# Patient Record
Sex: MALE | Race: BLACK OR AFRICAN AMERICAN | NOT HISPANIC OR LATINO | URBAN - METROPOLITAN AREA
[De-identification: names, ages, dates, MRNs, and addresses within clinical notes are randomized per-mention and may not be internally consistent; named-entity substitution may affect disease eponyms.]

---

## 2021-09-07 ENCOUNTER — INPATIENT (INPATIENT)
Facility: HOSPITAL | Age: 71
LOS: 2 days | Discharge: HOME | DRG: 247 | End: 2021-09-10
Attending: INTERNAL MEDICINE | Admitting: INTERNAL MEDICINE
Payer: COMMERCIAL

## 2021-09-07 VITALS
SYSTOLIC BLOOD PRESSURE: 158 MMHG | DIASTOLIC BLOOD PRESSURE: 82 MMHG | HEART RATE: 62 BPM | TEMPERATURE: 98 F | RESPIRATION RATE: 20 BRPM | WEIGHT: 154.98 LBS | OXYGEN SATURATION: 96 % | HEIGHT: 65 IN

## 2021-09-07 DIAGNOSIS — R94.39 ABNORMAL RESULT OF OTHER CARDIOVASCULAR FUNCTION STUDY: ICD-10-CM

## 2021-09-07 DIAGNOSIS — E11.9 TYPE 2 DIABETES MELLITUS WITHOUT COMPLICATIONS: ICD-10-CM

## 2021-09-07 DIAGNOSIS — I10 ESSENTIAL (PRIMARY) HYPERTENSION: ICD-10-CM

## 2021-09-07 LAB
A1C WITH ESTIMATED AVERAGE GLUCOSE RESULT: 5.6 % — SIGNIFICANT CHANGE UP (ref 4–5.6)
ALBUMIN SERPL ELPH-MCNC: 3.7 G/DL — SIGNIFICANT CHANGE UP (ref 3.3–5)
ALP SERPL-CCNC: 70 U/L — SIGNIFICANT CHANGE UP (ref 40–120)
ALT FLD-CCNC: 26 U/L — SIGNIFICANT CHANGE UP (ref 10–45)
ANION GAP SERPL CALC-SCNC: 10 MMOL/L — SIGNIFICANT CHANGE UP (ref 5–17)
APTT BLD: 31.1 SEC — SIGNIFICANT CHANGE UP (ref 27.5–35.5)
AST SERPL-CCNC: 23 U/L — SIGNIFICANT CHANGE UP (ref 10–40)
BASOPHILS # BLD AUTO: 0.08 K/UL — SIGNIFICANT CHANGE UP (ref 0–0.2)
BASOPHILS NFR BLD AUTO: 1.4 % — SIGNIFICANT CHANGE UP (ref 0–2)
BILIRUB SERPL-MCNC: 0.3 MG/DL — SIGNIFICANT CHANGE UP (ref 0.2–1.2)
BUN SERPL-MCNC: 8 MG/DL — SIGNIFICANT CHANGE UP (ref 7–23)
CALCIUM SERPL-MCNC: 9.5 MG/DL — SIGNIFICANT CHANGE UP (ref 8.4–10.5)
CHLORIDE SERPL-SCNC: 108 MMOL/L — SIGNIFICANT CHANGE UP (ref 96–108)
CHOLEST SERPL-MCNC: 193 MG/DL — SIGNIFICANT CHANGE UP
CO2 SERPL-SCNC: 24 MMOL/L — SIGNIFICANT CHANGE UP (ref 22–31)
CREAT SERPL-MCNC: 0.87 MG/DL — SIGNIFICANT CHANGE UP (ref 0.5–1.3)
EOSINOPHIL # BLD AUTO: 0.48 K/UL — SIGNIFICANT CHANGE UP (ref 0–0.5)
EOSINOPHIL NFR BLD AUTO: 8.4 % — HIGH (ref 0–6)
ESTIMATED AVERAGE GLUCOSE: 114 MG/DL — SIGNIFICANT CHANGE UP (ref 68–114)
GLUCOSE BLDC GLUCOMTR-MCNC: 106 MG/DL — HIGH (ref 70–99)
GLUCOSE BLDC GLUCOMTR-MCNC: 87 MG/DL — SIGNIFICANT CHANGE UP (ref 70–99)
GLUCOSE SERPL-MCNC: 104 MG/DL — HIGH (ref 70–99)
HCT VFR BLD CALC: 42.2 % — SIGNIFICANT CHANGE UP (ref 39–50)
HDLC SERPL-MCNC: 52 MG/DL — SIGNIFICANT CHANGE UP
HGB BLD-MCNC: 14.1 G/DL — SIGNIFICANT CHANGE UP (ref 13–17)
IMM GRANULOCYTES NFR BLD AUTO: 0.2 % — SIGNIFICANT CHANGE UP (ref 0–1.5)
INR BLD: 0.98 RATIO — SIGNIFICANT CHANGE UP (ref 0.88–1.16)
LIPID PNL WITH DIRECT LDL SERPL: 131 MG/DL — HIGH
LYMPHOCYTES # BLD AUTO: 2.74 K/UL — SIGNIFICANT CHANGE UP (ref 1–3.3)
LYMPHOCYTES # BLD AUTO: 47.8 % — HIGH (ref 13–44)
MAGNESIUM SERPL-MCNC: 2.1 MG/DL — SIGNIFICANT CHANGE UP (ref 1.6–2.6)
MCHC RBC-ENTMCNC: 30.3 PG — SIGNIFICANT CHANGE UP (ref 27–34)
MCHC RBC-ENTMCNC: 33.4 GM/DL — SIGNIFICANT CHANGE UP (ref 32–36)
MCV RBC AUTO: 90.6 FL — SIGNIFICANT CHANGE UP (ref 80–100)
MONOCYTES # BLD AUTO: 0.55 K/UL — SIGNIFICANT CHANGE UP (ref 0–0.9)
MONOCYTES NFR BLD AUTO: 9.6 % — SIGNIFICANT CHANGE UP (ref 2–14)
NEUTROPHILS # BLD AUTO: 1.87 K/UL — SIGNIFICANT CHANGE UP (ref 1.8–7.4)
NEUTROPHILS NFR BLD AUTO: 32.6 % — LOW (ref 43–77)
NON HDL CHOLESTEROL: 141 MG/DL — HIGH
NRBC # BLD: 0 /100 WBCS — SIGNIFICANT CHANGE UP (ref 0–0)
NT-PROBNP SERPL-SCNC: 114 PG/ML — SIGNIFICANT CHANGE UP (ref 0–300)
PLATELET # BLD AUTO: 188 K/UL — SIGNIFICANT CHANGE UP (ref 150–400)
POTASSIUM SERPL-MCNC: 4.1 MMOL/L — SIGNIFICANT CHANGE UP (ref 3.5–5.3)
POTASSIUM SERPL-SCNC: 4.1 MMOL/L — SIGNIFICANT CHANGE UP (ref 3.5–5.3)
PROT SERPL-MCNC: 6.4 G/DL — SIGNIFICANT CHANGE UP (ref 6–8.3)
PROTHROM AB SERPL-ACNC: 11.8 SEC — SIGNIFICANT CHANGE UP (ref 10.6–13.6)
RBC # BLD: 4.66 M/UL — SIGNIFICANT CHANGE UP (ref 4.2–5.8)
RBC # FLD: 13 % — SIGNIFICANT CHANGE UP (ref 10.3–14.5)
SARS-COV-2 RNA SPEC QL NAA+PROBE: SIGNIFICANT CHANGE UP
SODIUM SERPL-SCNC: 142 MMOL/L — SIGNIFICANT CHANGE UP (ref 135–145)
TRIGL SERPL-MCNC: 51 MG/DL — SIGNIFICANT CHANGE UP
TROPONIN T, HIGH SENSITIVITY RESULT: 8 NG/L — SIGNIFICANT CHANGE UP (ref 0–51)
TROPONIN T, HIGH SENSITIVITY RESULT: 8 NG/L — SIGNIFICANT CHANGE UP (ref 0–51)
WBC # BLD: 5.73 K/UL — SIGNIFICANT CHANGE UP (ref 3.8–10.5)
WBC # FLD AUTO: 5.73 K/UL — SIGNIFICANT CHANGE UP (ref 3.8–10.5)

## 2021-09-07 PROCEDURE — 93010 ELECTROCARDIOGRAM REPORT: CPT

## 2021-09-07 PROCEDURE — 71046 X-RAY EXAM CHEST 2 VIEWS: CPT | Mod: 26

## 2021-09-07 PROCEDURE — 99223 1ST HOSP IP/OBS HIGH 75: CPT | Mod: GC

## 2021-09-07 PROCEDURE — 99285 EMERGENCY DEPT VISIT HI MDM: CPT | Mod: 25

## 2021-09-07 RX ORDER — DEXTROSE 50 % IN WATER 50 %
12.5 SYRINGE (ML) INTRAVENOUS ONCE
Refills: 0 | Status: DISCONTINUED | OUTPATIENT
Start: 2021-09-07 | End: 2021-09-10

## 2021-09-07 RX ORDER — GLUCAGON INJECTION, SOLUTION 0.5 MG/.1ML
1 INJECTION, SOLUTION SUBCUTANEOUS ONCE
Refills: 0 | Status: DISCONTINUED | OUTPATIENT
Start: 2021-09-07 | End: 2021-09-10

## 2021-09-07 RX ORDER — DEXTROSE 50 % IN WATER 50 %
25 SYRINGE (ML) INTRAVENOUS ONCE
Refills: 0 | Status: DISCONTINUED | OUTPATIENT
Start: 2021-09-07 | End: 2021-09-10

## 2021-09-07 RX ORDER — ASPIRIN/CALCIUM CARB/MAGNESIUM 324 MG
81 TABLET ORAL DAILY
Refills: 0 | Status: DISCONTINUED | OUTPATIENT
Start: 2021-09-07 | End: 2021-09-10

## 2021-09-07 RX ORDER — SODIUM CHLORIDE 9 MG/ML
1000 INJECTION, SOLUTION INTRAVENOUS
Refills: 0 | Status: DISCONTINUED | OUTPATIENT
Start: 2021-09-07 | End: 2021-09-10

## 2021-09-07 RX ORDER — ATORVASTATIN CALCIUM 80 MG/1
40 TABLET, FILM COATED ORAL AT BEDTIME
Refills: 0 | Status: DISCONTINUED | OUTPATIENT
Start: 2021-09-07 | End: 2021-09-10

## 2021-09-07 RX ORDER — HEPARIN SODIUM 5000 [USP'U]/ML
5000 INJECTION INTRAVENOUS; SUBCUTANEOUS EVERY 12 HOURS
Refills: 0 | Status: DISCONTINUED | OUTPATIENT
Start: 2021-09-07 | End: 2021-09-10

## 2021-09-07 RX ORDER — DEXTROSE 50 % IN WATER 50 %
15 SYRINGE (ML) INTRAVENOUS ONCE
Refills: 0 | Status: DISCONTINUED | OUTPATIENT
Start: 2021-09-07 | End: 2021-09-10

## 2021-09-07 RX ORDER — INSULIN LISPRO 100/ML
VIAL (ML) SUBCUTANEOUS
Refills: 0 | Status: DISCONTINUED | OUTPATIENT
Start: 2021-09-07 | End: 2021-09-10

## 2021-09-07 RX ADMIN — ATORVASTATIN CALCIUM 40 MILLIGRAM(S): 80 TABLET, FILM COATED ORAL at 21:41

## 2021-09-07 RX ADMIN — HEPARIN SODIUM 5000 UNIT(S): 5000 INJECTION INTRAVENOUS; SUBCUTANEOUS at 17:07

## 2021-09-07 NOTE — ED ADULT NURSE NOTE - OBJECTIVE STATEMENT
71 year old male with PMH of arthritis presents to the ED via EMS from cardiologist office for abnormal stress test. As per EMS, patient was given 324 of ASA prior to arrival to ED. Patient is A&Ox3, states he has been having intermittent chest pain x 2 months. Patient states he has no cardiac history - does not take any medication on a daily basis except for sips of beer "as a home remedy." Patient denies currently having chest pain. VSS. EKG done and given to ED MD Hicks. Placed patient on CM - NSR. 18g peripheral IV placed by EMS in L wrist patent to flush and draws back blood. labs drawn and sent to lab. Patient undressed and placed into gown, call bell in hand and side rails up with bed in lowest position for safety. blanket provided. Comfort and safety provided.

## 2021-09-07 NOTE — ED PROVIDER NOTE - PROGRESS NOTE DETAILS
Moe, PGY3 - spoke w/ Dr. Escobar discussed case, s/p aspirin, no cp now, admit for cath in setting of unstable angina. agrees w/ plan

## 2021-09-07 NOTE — ED PROVIDER NOTE - ATTENDING CONTRIBUTION TO CARE
------------ATTENDING NOTE------------   pt transferred by EMS from cardiology stress center for abnormal stress test today, pt asymptomatic, no chest pain/discomfort or sob/dyspnea, very well appearing, clear chest w/o distress, nml cardiac exam, equal distal pulses, soft benign abd, no edema/calf tenderness, awaiting labs/imaging and d/w pt's Cardiologist, given  mg by EMS -->  - Wilfrido Hicks MD   ------------------------------------------------

## 2021-09-07 NOTE — ED PROVIDER NOTE - PHYSICAL EXAMINATION
Gen: well developed male NAD   HEENT: NCAT, EOMI, no nasal discharge, mucous membranes moist  CV: RRR, +S1/S2, no M/R/G distal pulse intact and equal b/l   Resp: CTAB, no W/R/R  GI: Abdomen soft non-distended, NTTP, no masses  MSK: No open wounds no LE edema  Neuro: A&Ox4, following commands, moving all four extremities spontaneously  Psych: appropriate mood

## 2021-09-07 NOTE — ED PROVIDER NOTE - OBJECTIVE STATEMENT
70 y/o M PMH HTN DM presents to ED sent by cardiologist for EKG changes during nuclear stress test today - had TWI in anterior leads. pt notes intermittent L sided chest pain, comes and goes, not worse w/ exertion, sometimes w/ SOB, sometimes radiating to neck / back. Denies cp this morning during test or now. Pt came by EMS, s/p 324mg aspirin PTA.    PMD: Dr. Chandra   Cardiology: Dr. John Escobar 72 y/o M PMH HTN DM presents to ED sent by cardiologist for EKG changes during nuclear stress test today - had TWI in anterior leads. pt notes intermittent L sided chest pain, comes and goes, not worse w/ exertion, sometimes w/ SOB, sometimes radiating to neck / back. Denies cp this morning during test or now. Pt came by EMS, s/p 324mg aspirin PTA.    IM: Dr. John Escobar   Cardiology: Dr. Manish Russ

## 2021-09-07 NOTE — H&P ADULT - HISTORY OF PRESENT ILLNESS
70 y/o M PMH HTN DM presents to ED sent by cardiologist dr Manish newman for EKG changes during nuclear stress test today , developed TWI in anterior leads (V3). pt notes intermittent L sided chest pain for the past few days, sometimes w/ SOB, sometimes radiating to neck / back. Denies cp this morning during test or now.

## 2021-09-07 NOTE — CONSULT NOTE ADULT - SUBJECTIVE AND OBJECTIVE BOX
Patient seen and evaluated @   Chief Complaint:     HPI:  72 y/o M PMH HTN DM presents to ED sent by cardiologist dr Manish newman for EKG changes during nuclear stress test today , developed TWI in anterior leads (V3). pt notes intermittent L sided chest pain for the past few days, sometimes w/ SOB, sometimes radiating to neck / back. Denies cp this morning during test or now. (07 Sep 2021 14:12)    Upon examination, patient remained symptom free. Denies any cardiopulmonary symptom at the moment.     PMH:   HTN (hypertension)    DM (diabetes mellitus)      PSH:   No significant past surgical history      Medications:   aspirin enteric coated 81 milliGRAM(s) Oral daily  atorvastatin 40 milliGRAM(s) Oral at bedtime  dextrose 40% Gel 15 Gram(s) Oral once  dextrose 5%. 1000 milliLiter(s) IV Continuous <Continuous>  dextrose 5%. 1000 milliLiter(s) IV Continuous <Continuous>  dextrose 50% Injectable 25 Gram(s) IV Push once  dextrose 50% Injectable 12.5 Gram(s) IV Push once  dextrose 50% Injectable 25 Gram(s) IV Push once  glucagon  Injectable 1 milliGRAM(s) IntraMuscular once  heparin   Injectable 5000 Unit(s) SubCutaneous every 12 hours  insulin lispro (ADMELOG) corrective regimen sliding scale   SubCutaneous three times a day before meals    Allergies:  No Known Allergies    FAMILY HISTORY:    Social History:  Smoking: former smoker (quit 1972)  Alcohol: no EtOH  Drugs: no rec drug use    Review of Systems:  Constitutional: [ ] Fever [ ] Chills [ ] Fatigue [ ] Weight change   HEENT: [ ] Blurred vision [ ] Eye Pain [ ] Headache [ ] Runny nose [ ] Sore Throat   Respiratory: [ ] Cough [ ] Wheezing [ ] Shortness of breath  Cardiovascular: [ ] Chest Pain [ ] Palpitations [ ] ADAMS [ ] PND [ ] Orthopnea  Gastrointestinal: [ ] Abdominal Pain [ ] Diarrhea [ ] Constipation [ ] Hemorrhoids [ ] Nausea [ ] Vomiting  Genitourinary: [ ] Nocturia [ ] Dysuria [ ] Incontinence  Extremities: [ ] Swelling [ ] Joint Pain  Neurologic: [ ] Focal deficit [ ] Paresthesias [ ] Syncope  Lymphatic: [ ] Swelling [ ] Lymphadenopathy   Skin: [ ] Rash [ ] Ecchymoses [ ] Wounds [ ] Lesions  Psychiatry: [ ] Depression [ ] Suicidal/Homicidal Ideation [ ] Anxiety [ ] Sleep Disturbances  [x ] 10 point review of systems is otherwise negative except as mentioned above            [ ]Unable to obtain    Physical Exam:  T(C): 36.6 (09-07-21 @ 13:05), Max: 36.7 (09-07-21 @ 11:01)  HR: 72 (09-07-21 @ 13:05) (62 - 72)  BP: 145/78 (09-07-21 @ 13:05) (145/78 - 160/81)  RR: 18 (09-07-21 @ 13:05) (18 - 20)  SpO2: 97% (09-07-21 @ 13:05) (96% - 100%)  Wt(kg): --    Daily Height in cm: 165.1 (07 Sep 2021 10:50)    Daily     Appearance: NAD  Eyes: PERRL, EOMI  HENT: Normal oral mus[place], NC/AT  Cardiovascular: normal S1 and S2, RRR, no m/r/g, no edema  Respiratory: Clear to auscultation bilaterally  Gastrointestinal: Soft, non-tender, non-distended, BS+  Musculoskeletal: No clubbing, no joint deformity   Neurologic: Non-focal  Lymphatic: No lymphadenopathy  Psychiatry: AAOx3, mood & affect appropriate  Skin: No rashes, no ecchymoses, no cyanosis    Cardiovascular Diagnostic Testing:  ECG: no ischemic changes.    Echo:    Stress Testing:    Cath:    Interpretation of Telemetry:    Imaging:    Labs:                        14.1   5.73  )-----------( 188      ( 07 Sep 2021 11:08 )             42.2     09-07    142  |  108  |  8   ----------------------------<  104<H>  4.1   |  24  |  0.87    Ca    9.5      07 Sep 2021 11:08  Mg     2.1     09-07    TPro  6.4  /  Alb  3.7  /  TBili  0.3  /  DBili  x   /  AST  23  /  ALT  26  /  AlkPhos  70  09-07    PT/INR - ( 07 Sep 2021 11:08 )   PT: 11.8 sec;   INR: 0.98 ratio         PTT - ( 07 Sep 2021 11:08 )  PTT:31.1 sec      Serum Pro-Brain Natriuretic Peptide: 114 pg/mL (09-07 @ 11:08)         HPI:  72 y/o M PMH HTN DM presents to ED sent by cardiologist, Dr Manish Chan for EKG changes during nuclear stress test today. Developed TWI in anterior leads (V3). Pt notes intermittent L sided chest pain for the past few days, sometimes w/ SOB, sometimes radiating to neck / back. Denies cp this morning during test or now. (07 Sep 2021 14:12)    Upon examination, patient remained symptom free. Denies any cardiopulmonary symptom at the moment.     PMH:   HTN (hypertension)  DM (diabetes mellitus)      PSH:   No significant past surgical history        Medications:   aspirin enteric coated 81 milliGRAM(s) Oral daily  atorvastatin 40 milliGRAM(s) Oral at bedtime  dextrose 40% Gel 15 Gram(s) Oral once  dextrose 5%. 1000 milliLiter(s) IV Continuous <Continuous>  dextrose 5%. 1000 milliLiter(s) IV Continuous <Continuous>  dextrose 50% Injectable 25 Gram(s) IV Push once  dextrose 50% Injectable 12.5 Gram(s) IV Push once  dextrose 50% Injectable 25 Gram(s) IV Push once  glucagon  Injectable 1 milliGRAM(s) IntraMuscular once  heparin   Injectable 5000 Unit(s) SubCutaneous every 12 hours  insulin lispro (ADMELOG) corrective regimen sliding scale   SubCutaneous three times a day before meals    Allergies:  No Known Allergies    FAMILY HISTORY:  No heart disease      Social History:  Smoking: former smoker (quit 1972)  Alcohol: no EtOH  Drugs: no rec drug use    Review of Systems:  Constitutional: [ ] Fever [ ] Chills [ ] Fatigue [ ] Weight change   HEENT: [ ] Blurred vision [ ] Eye Pain [ ] Headache [ ] Runny nose [ ] Sore Throat   Respiratory: [ ] Cough [ ] Wheezing [ ] Shortness of breath  Cardiovascular: [ ] Chest Pain [ ] Palpitations [ ] ADAMS [ ] PND [ ] Orthopnea  Gastrointestinal: [ ] Abdominal Pain [ ] Diarrhea [ ] Constipation [ ] Hemorrhoids [ ] Nausea [ ] Vomiting  Genitourinary: [ ] Nocturia [ ] Dysuria [ ] Incontinence  Extremities: [ ] Swelling [ ] Joint Pain  Neurologic: [ ] Focal deficit [ ] Paresthesias [ ] Syncope  Lymphatic: [ ] Swelling [ ] Lymphadenopathy   Skin: [ ] Rash [ ] Ecchymoses [ ] Wounds [ ] Lesions  Psychiatry: [ ] Depression [ ] Suicidal/Homicidal Ideation [ ] Anxiety [ ] Sleep Disturbances  [x ] 10 point review of systems is otherwise negative except as mentioned above                  Physical Exam:  T(C): 36.6 (09-07-21 @ 13:05), Max: 36.7 (09-07-21 @ 11:01)  HR: 72 (09-07-21 @ 13:05) (62 - 72)  BP: 145/78 (09-07-21 @ 13:05) (145/78 - 160/81)  RR: 18 (09-07-21 @ 13:05) (18 - 20)  SpO2: 97% (09-07-21 @ 13:05) (96% - 100%)  Daily Height in cm: 165.1 (07 Sep 2021 10:50)        Appearance: NAD  Eyes: PERRL, EOMI  HENT: Normal oral [place], NC/AT  Cardiovascular: normal S1 and S2, RRR, no m/r/g, no edema  Respiratory: Clear to auscultation bilaterally  Gastrointestinal: Soft, non-tender, non-distended, BS+  Musculoskeletal: No clubbing, no joint deformity   Neurologic: Non-focal  Lymphatic: No lymphadenopathy  Psychiatry: AAOx3, mood & affect appropriate  Skin: No rashes, no ecchymoses, no cyanosis        ECG: NSR, no ischemic changes.      Labs:                     14.1   5.73  )-----------( 188      ( 07 Sep 2021 11:08 )             42.2     09-07  142  |  108  |  8   ----------------------------<  104<H>  4.1   |  24  |  0.87    Ca    9.5      07 Sep 2021 11:08  Mg     2.1     09-07    TPro  6.4  /  Alb  3.7  /  TBili  0.3  /  DBili  x   /  AST  23  /  ALT  26  /  AlkPhos  70  09-07    PT/INR - ( 07 Sep 2021 11:08 )   PT: 11.8 sec;   INR: 0.98 ratio    PTT - ( 07 Sep 2021 11:08 )  PTT:31.1 sec    Serum Pro-Brain Natriuretic Peptide: 114 pg/mL (09-07 @ 11:08)

## 2021-09-07 NOTE — CONSULT NOTE ADULT - ASSESSMENT
72 y/o M PMH HTN DM presents to ED sent by cardiologist dr Manish newman for ACS evaluation    # Chest pain  - patient currently remains symptomatically free.  - document pt brought in reviewed (9/7/21 Note to go with EMS):   - TTE on 7/2/2021 showed normal LV dimensions with normal systolic function EF 65%. mild AR.  - Nuc stress test (pharmacologic) 7/13/21 showed no evidence of ischemia by perfusion criteria  - EKG on 7/2/21 showing TwI in lead V3-V6. But EKG in the ED without acute ischemic changes, no TwI, no ST deviation fitting STEMI criteria.  - Trop 8 -> 8.  - concern for active myocardial infarction is low.  - Would acquire TTE.   - Can acquire CCTA for coronary anatomy evaluation given that functional perfusion test with nuc stress test was already done as outpatient.  - monitor on tele.  - monitor electrolytes  - repeat trop and EKG if pt develops chest pain.     72 y/o M PMH HTN DM, presents to ED sent by cardiologist Dr Manish Chan for ACS evaluation      #1.   Chest pain, - patient currently remains free of symptoms.  Document pt. brought in reviewed (9/7/21 Note to go with EMS):   - TTE on 7/2/2021 showed normal LV dimensions with normal systolic function EF 65%. mild AR.  - Nuc stress test (pharmacologic) 7/13/21 showed no evidence of ischemia by perfusion criteria  - EKG on 7/2/21 showing TwI in lead V3-V6. But EKG in the ED without acute ischemic changes, no TwI, no ST deviation fitting STEMI criteria.  - Trop 8 -> 8.  - concern for active myocardial infarction is low.  - Would acquire TTE.   - Can acquire CCTA for coronary anatomy evaluation given that functional perfusion test with nuc stress test was already done as outpatient.  - monitor on tele.  - monitor electrolytes  - repeat trop and EKG if pt develops chest pain.      Jacquie Mayer M.D.  Cardiology fellow    Plan discussed with cardiology fellow; patient seen and examined.       I was physically present for the key portions of the evaluation and management (E/M) service provided.    I agree with the above history, physical, and plan which I have reviewed and edited where appropriate.  Keshav Garcia M.D.  Cardiology Attending, Consult Service    For Cardiology consults and questions, all Cardiology service information can be found 24/7 on [email or web address], password: [email or web address]

## 2021-09-08 LAB
A1C WITH ESTIMATED AVERAGE GLUCOSE RESULT: 5.6 % — SIGNIFICANT CHANGE UP (ref 4–5.6)
COVID-19 SPIKE DOMAIN AB INTERP: NEGATIVE — SIGNIFICANT CHANGE UP
COVID-19 SPIKE DOMAIN ANTIBODY RESULT: 0.4 U/ML — SIGNIFICANT CHANGE UP
ESTIMATED AVERAGE GLUCOSE: 114 MG/DL — SIGNIFICANT CHANGE UP (ref 68–114)
GLUCOSE BLDC GLUCOMTR-MCNC: 104 MG/DL — HIGH (ref 70–99)
GLUCOSE BLDC GLUCOMTR-MCNC: 84 MG/DL — SIGNIFICANT CHANGE UP (ref 70–99)
GLUCOSE BLDC GLUCOMTR-MCNC: 88 MG/DL — SIGNIFICANT CHANGE UP (ref 70–99)
GLUCOSE BLDC GLUCOMTR-MCNC: 99 MG/DL — SIGNIFICANT CHANGE UP (ref 70–99)
HCV AB S/CO SERPL IA: 0.15 S/CO — SIGNIFICANT CHANGE UP (ref 0–0.99)
HCV AB SERPL-IMP: SIGNIFICANT CHANGE UP
SARS-COV-2 IGG+IGM SERPL QL IA: 0.4 U/ML — SIGNIFICANT CHANGE UP
SARS-COV-2 IGG+IGM SERPL QL IA: NEGATIVE — SIGNIFICANT CHANGE UP

## 2021-09-08 PROCEDURE — 93306 TTE W/DOPPLER COMPLETE: CPT | Mod: 26

## 2021-09-08 PROCEDURE — 99233 SBSQ HOSP IP/OBS HIGH 50: CPT | Mod: GC

## 2021-09-08 PROCEDURE — 75574 CT ANGIO HRT W/3D IMAGE: CPT | Mod: 26

## 2021-09-08 RX ORDER — INFLUENZA VIRUS VACCINE 15; 15; 15; 15 UG/.5ML; UG/.5ML; UG/.5ML; UG/.5ML
0.5 SUSPENSION INTRAMUSCULAR ONCE
Refills: 0 | Status: COMPLETED | OUTPATIENT
Start: 2021-09-08 | End: 2021-09-08

## 2021-09-08 RX ADMIN — Medication 81 MILLIGRAM(S): at 11:06

## 2021-09-08 RX ADMIN — HEPARIN SODIUM 5000 UNIT(S): 5000 INJECTION INTRAVENOUS; SUBCUTANEOUS at 05:35

## 2021-09-08 RX ADMIN — HEPARIN SODIUM 5000 UNIT(S): 5000 INJECTION INTRAVENOUS; SUBCUTANEOUS at 17:11

## 2021-09-08 RX ADMIN — ATORVASTATIN CALCIUM 40 MILLIGRAM(S): 80 TABLET, FILM COATED ORAL at 21:08

## 2021-09-08 NOTE — PROGRESS NOTE ADULT - SUBJECTIVE AND OBJECTIVE BOX
Patient is a 71y old  Male who presents with a chief complaint of     HPI:  No CP at rest. Coronary CT done    PAST MEDICAL & SURGICAL HISTORY:  HTN (hypertension)    DM (diabetes mellitus)    No significant past surgical history        Review of Systems:   CONSTITUTIONAL: No fever, weight loss, or fatigue  EYES: No eye pain, visual disturbances, or discharge  ENMT:  No difficulty hearing, tinnitus, vertigo; No sinus or throat pain  NECK: No pain or stiffness  BREASTS: No pain, masses, or nipple discharge  RESPIRATORY: No cough, wheezing, chills or hemoptysis; No shortness of breath  CARDIOVASCULAR: No chest pain, palpitations, dizziness, or leg swelling  GASTROINTESTINAL: No abdominal or epigastric pain. No nausea, vomiting, or hematemesis; No diarrhea or constipation. No melena or hematochezia.  GENITOURINARY: No dysuria, frequency, hematuria, or incontinence  NEUROLOGICAL: No headaches, memory loss, loss of strength, numbness, or tremors  SKIN: No itching, burning, rashes, or lesions   LYMPH NODES: No enlarged glands  ENDOCRINE: No heat or cold intolerance; No hair loss  MUSCULOSKELETAL: No joint pain or swelling; No muscle, back, or extremity pain  PSYCHIATRIC: No depression, anxiety, mood swings, or difficulty sleeping  HEME/LYMPH: No easy bruising, or bleeding gums  ALLERY AND IMMUNOLOGIC: No hives or eczema    Allergies    No Known Allergies    Intolerances        Social History:     FAMILY HISTORY:      MEDICATIONS  (STANDING):  aspirin enteric coated 81 milliGRAM(s) Oral daily  atorvastatin 40 milliGRAM(s) Oral at bedtime  dextrose 40% Gel 15 Gram(s) Oral once  dextrose 5%. 1000 milliLiter(s) (50 mL/Hr) IV Continuous <Continuous>  dextrose 5%. 1000 milliLiter(s) (100 mL/Hr) IV Continuous <Continuous>  dextrose 50% Injectable 25 Gram(s) IV Push once  dextrose 50% Injectable 12.5 Gram(s) IV Push once  dextrose 50% Injectable 25 Gram(s) IV Push once  glucagon  Injectable 1 milliGRAM(s) IntraMuscular once  heparin   Injectable 5000 Unit(s) SubCutaneous every 12 hours  insulin lispro (ADMELOG) corrective regimen sliding scale   SubCutaneous three times a day before meals    MEDICATIONS  (PRN):        CAPILLARY BLOOD GLUCOSE      POCT Blood Glucose.: 88 mg/dL (08 Sep 2021 16:13)  POCT Blood Glucose.: 99 mg/dL (08 Sep 2021 11:27)  POCT Blood Glucose.: 84 mg/dL (08 Sep 2021 07:28)  POCT Blood Glucose.: 106 mg/dL (07 Sep 2021 21:16)    I&O's Summary    07 Sep 2021 07:01  -  08 Sep 2021 07:00  --------------------------------------------------------  IN: 0 mL / OUT: 600 mL / NET: -600 mL    08 Sep 2021 07:01  -  08 Sep 2021 18:38  --------------------------------------------------------  IN: 560 mL / OUT: 0 mL / NET: 560 mL        PHYSICAL EXAM:  Vital Signs Last 24 Hrs  T(C): 36.9 (08 Sep 2021 11:20), Max: 36.9 (08 Sep 2021 11:20)  T(F): 98.4 (08 Sep 2021 11:20), Max: 98.4 (08 Sep 2021 11:20)  HR: 68 (08 Sep 2021 11:20) (62 - 68)  BP: 120/70 (08 Sep 2021 11:20) (120/70 - 158/92)  BP(mean): --  RR: 18 (08 Sep 2021 11:20) (18 - 18)  SpO2: 97% (08 Sep 2021 11:20) (97% - 98%)    GENERAL: NAD, well-developed  HEAD:  Atraumatic, Normocephalic  EYES: EOMI, PERRLA, conjunctiva and sclera clear  NECK: Supple, No JVD  CHEST/LUNG: Clear to auscultation bilaterally; No wheeze  HEART: Regular rate and rhythm; No murmurs, rubs, or gallops  ABDOMEN: Soft, Nontender, Nondistended; Bowel sounds present  EXTREMITIES:  2+ Peripheral Pulses, No clubbing, cyanosis, or edema  PSYCH: AAOx3  NEUROLOGY: non-focal  SKIN: No rashes or lesions    LABS:                        14.1   5.73  )-----------( 188      ( 07 Sep 2021 11:08 )             42.2     09-07    142  |  108  |  8   ----------------------------<  104<H>  4.1   |  24  |  0.87    Ca    9.5      07 Sep 2021 11:08  Mg     2.1     09-07    TPro  6.4  /  Alb  3.7  /  TBili  0.3  /  DBili  x   /  AST  23  /  ALT  26  /  AlkPhos  70  09-07    PT/INR - ( 07 Sep 2021 11:08 )   PT: 11.8 sec;   INR: 0.98 ratio         PTT - ( 07 Sep 2021 11:08 )  PTT:31.1 sec          RADIOLOGY & ADDITIONAL TESTS:    Imaging Personally Reviewed:    Consultant(s) Notes Reviewed:      Care Discussed with Consultants/Other Providers:

## 2021-09-08 NOTE — PROGRESS NOTE ADULT - ASSESSMENT
72 y/o M PMH HTN DM, presents to ED sent by cardiologist Dr Manish Chan for ACS evaluation      #1.   Chest pain, - patient currently remains free of symptoms.  Document pt. brought in reviewed (9/7/21 Note to go with EMS):   - TTE on 7/2/2021 showed normal LV dimensions with normal systolic function EF 65%. mild AR.  - Nuc stress test (pharmacologic) 7/13/21 showed no evidence of ischemia by perfusion criteria  - EKG on 7/2/21 showing TwI in lead V3-V6. But EKG in the ED without acute ischemic changes, no TwI, no ST deviation fitting STEMI criteria.  - Trop 8 -> 8.  - concern for active myocardial infarction is low.  - Would acquire TTE.   - Can acquire CCTA for coronary anatomy evaluation given that functional perfusion test with nuc stress test was already done as outpatient.  - monitor on tele.  - monitor electrolytes  - repeat trop and EKG if pt develops chest pain.      Jacquie Mayer M.D.  Cardiology fellow 72 y/o M PMH HTN DM, presents to ED sent by cardiologist Dr Manish Chan for ACS evaluation      #1.   Chest pain, - patient currently remains free of symptoms.  Document pt. brought in reviewed (9/7/21 Note to go with EMS):   - TTE on 7/2/2021 showed normal LV dimensions with normal systolic function EF 65%. mild AR.  - Nuc stress test (pharmacologic) 7/13/21 showed no evidence of ischemia by perfusion criteria  - EKG on 7/2/21 showing TwI in lead V3-V6. But EKG in the ED without acute ischemic changes, no TwI, no ST deviation fitting STEMI criteria.  - Trop 8 -> 8.    - concern for active myocardial infarction is low.  - Would acquire TTE.   - Can acquire CCTA for coronary anatomy evaluation given that functional perfusion test with nuc stress test was already done as outpatient.  - monitor on tele.  - monitor electrolytes  - repeat trop and EKG if pt develops chest pain.      Jacquie Mayer M.D.  Cardiology fellow    Plan discussed with cardiology fellow; patient seen and examined.       I was physically present for the key portions of the evaluation and management (E/M) service provided.    I agree with the above history, physical, and plan which I have reviewed and edited where appropriate.   Keshav Garcia M.D.  Cardiology Attending, Consult Service    For Cardiology consults and questions, all Cardiology service information can be found 24/7 on [email or web address], password: [email or web address]

## 2021-09-08 NOTE — PROGRESS NOTE ADULT - SUBJECTIVE AND OBJECTIVE BOX
Patient is a 71y old  Male who presents with a chief complaint of     SUBJECTIVE / OVERNIGHT EVENTS:  no acute events overnight  patinet denies active cardiopulmonary symptom this AM  Ambulated without difficulty.    MEDICATIONS  (STANDING):  aspirin enteric coated 81 milliGRAM(s) Oral daily  atorvastatin 40 milliGRAM(s) Oral at bedtime  dextrose 40% Gel 15 Gram(s) Oral once  dextrose 5%. 1000 milliLiter(s) (50 mL/Hr) IV Continuous <Continuous>  dextrose 5%. 1000 milliLiter(s) (100 mL/Hr) IV Continuous <Continuous>  dextrose 50% Injectable 25 Gram(s) IV Push once  dextrose 50% Injectable 12.5 Gram(s) IV Push once  dextrose 50% Injectable 25 Gram(s) IV Push once  glucagon  Injectable 1 milliGRAM(s) IntraMuscular once  heparin   Injectable 5000 Unit(s) SubCutaneous every 12 hours  insulin lispro (ADMELOG) corrective regimen sliding scale   SubCutaneous three times a day before meals    MEDICATIONS  (PRN):      T(C): 36.7 (09-08-21 @ 04:23), Max: 36.8 (09-07-21 @ 20:39)  HR: 62 (09-08-21 @ 04:23) (62 - 72)  BP: 158/76 (09-08-21 @ 04:23) (145/78 - 160/81)  RR: 18 (09-08-21 @ 04:23) (18 - 20)  SpO2: 98% (09-08-21 @ 04:23) (96% - 100%)  CAPILLARY BLOOD GLUCOSE      POCT Blood Glucose.: 84 mg/dL (08 Sep 2021 07:28)  POCT Blood Glucose.: 106 mg/dL (07 Sep 2021 21:16)  POCT Blood Glucose.: 87 mg/dL (07 Sep 2021 17:21)    I&O's Summary    07 Sep 2021 07:01  -  08 Sep 2021 07:00  --------------------------------------------------------  IN: 0 mL / OUT: 600 mL / NET: -600 mL    08 Sep 2021 07:01  -  08 Sep 2021 10:02  --------------------------------------------------------  IN: 240 mL / OUT: 0 mL / NET: 240 mL        PHYSICAL EXAM:  Appearance: NAD  Eyes: PERRL, EOMI  HENT: Normal oral muscosa, NC/AT  Cardiovascular: normal S1 and S2, RRR, diastolic murmur best heard at RUSB, no edema  Respiratory: Clear to auscultation bilaterally  Gastrointestinal: Soft, non-tender, non-distended, BS+  Musculoskeletal: No clubbing, no joint deformity   Neurologic: Non-focal  Lymphatic: No lymphadenopathy  Psychiatry: AAOx3, mood & affect appropriate  Skin: No rashes, no ecchymoses, no cyanosis    LABS:                        14.1   5.73  )-----------( 188      ( 07 Sep 2021 11:08 )             42.2     09-07    142  |  108  |  8   ----------------------------<  104<H>  4.1   |  24  |  0.87    Ca    9.5      07 Sep 2021 11:08  Mg     2.1     09-07    TPro  6.4  /  Alb  3.7  /  TBili  0.3  /  DBili  x   /  AST  23  /  ALT  26  /  AlkPhos  70  09-07    PT/INR - ( 07 Sep 2021 11:08 )   PT: 11.8 sec;   INR: 0.98 ratio         PTT - ( 07 Sep 2021 11:08 )  PTT:31.1 sec          RADIOLOGY & ADDITIONAL TESTS:    Imaging Personally Reviewed: SCARLETT    Consultant(s) Notes Reviewed:  SCARLETT    Care Discussed with Consultants/Other Providers: SCARLETT   Patient is a 71y old  Male who presents with a chief complaint of     SUBJECTIVE / OVERNIGHT EVENTS:  no acute events overnight  patinet denies active cardiopulmonary symptom this AM  Ambulated without difficulty.    MEDICATIONS  (STANDING):  aspirin enteric coated 81 milliGRAM(s) Oral daily  atorvastatin 40 milliGRAM(s) Oral at bedtime  dextrose 40% Gel 15 Gram(s) Oral once  dextrose 5%. 1000 milliLiter(s) (50 mL/Hr) IV Continuous <Continuous>  dextrose 5%. 1000 milliLiter(s) (100 mL/Hr) IV Continuous <Continuous>  dextrose 50% Injectable 25 Gram(s) IV Push once  dextrose 50% Injectable 12.5 Gram(s) IV Push once  dextrose 50% Injectable 25 Gram(s) IV Push once  glucagon  Injectable 1 milliGRAM(s) IntraMuscular once  heparin   Injectable 5000 Unit(s) SubCutaneous every 12 hours  insulin lispro (ADMELOG) corrective regimen sliding scale   SubCutaneous three times a day before meals        T(C): 36.7 (09-08-21 @ 04:23), Max: 36.8 (09-07-21 @ 20:39)  HR: 62 (09-08-21 @ 04:23) (62 - 72)  BP: 158/76 (09-08-21 @ 04:23) (145/78 - 160/81)  RR: 18 (09-08-21 @ 04:23) (18 - 20)  SpO2: 98% (09-08-21 @ 04:23) (96% - 100%)      PHYSICAL EXAM:  Appearance: NAD  Eyes: PERRL, EOMI  HENT: Normal oral muscosa, NC/AT  Cardiovascular: normal S1 and S2, RRR, diastolic murmur best heard at RUSB, no edema  Respiratory: Clear to auscultation bilaterally  Gastrointestinal: Soft, non-tender, non-distended, BS+  Musculoskeletal: No clubbing, no joint deformity   Neurologic: Non-focal  Lymphatic: No lymphadenopathy  Psychiatry: AAOx3, mood & affect appropriate  Skin: No rashes, no ecchymoses, no cyanosis      LABS:                      14.1   5.73  )-----------( 188      ( 07 Sep 2021 11:08 )             42.2     09-07  142  |  108  |  8   ----------------------------<  104<H>  4.1   |  24  |  0.87    Ca    9.5      07 Sep 2021 11:08  Mg     2.1     09-07    TPro  6.4  /  Alb  3.7  /  TBili  0.3  /  DBili  x   /  AST  23  /  ALT  26  /  AlkPhos  70  09-07    PT/INR - ( 07 Sep 2021 11:08 )   PT: 11.8 sec;   INR: 0.98 ratio    PTT - ( 07 Sep 2021 11:08 )  PTT:31.1 sec

## 2021-09-08 NOTE — PATIENT PROFILE ADULT - DEAF OR HARD OF HEARING?
----- Message from Catrina Heck MD sent at 9/30/2020  9:04 AM CDT -----  Labs are normal.  
Spoke with patient she was notified that her labs were normal.  
no

## 2021-09-09 LAB
ANION GAP SERPL CALC-SCNC: 10 MMOL/L — SIGNIFICANT CHANGE UP (ref 5–17)
BUN SERPL-MCNC: 14 MG/DL — SIGNIFICANT CHANGE UP (ref 7–23)
CALCIUM SERPL-MCNC: 9.5 MG/DL — SIGNIFICANT CHANGE UP (ref 8.4–10.5)
CHLORIDE SERPL-SCNC: 105 MMOL/L — SIGNIFICANT CHANGE UP (ref 96–108)
CO2 SERPL-SCNC: 25 MMOL/L — SIGNIFICANT CHANGE UP (ref 22–31)
CREAT SERPL-MCNC: 1.07 MG/DL — SIGNIFICANT CHANGE UP (ref 0.5–1.3)
GLUCOSE BLDC GLUCOMTR-MCNC: 134 MG/DL — HIGH (ref 70–99)
GLUCOSE BLDC GLUCOMTR-MCNC: 74 MG/DL — SIGNIFICANT CHANGE UP (ref 70–99)
GLUCOSE BLDC GLUCOMTR-MCNC: 82 MG/DL — SIGNIFICANT CHANGE UP (ref 70–99)
GLUCOSE BLDC GLUCOMTR-MCNC: 84 MG/DL — SIGNIFICANT CHANGE UP (ref 70–99)
GLUCOSE SERPL-MCNC: 88 MG/DL — SIGNIFICANT CHANGE UP (ref 70–99)
POTASSIUM SERPL-MCNC: 4.1 MMOL/L — SIGNIFICANT CHANGE UP (ref 3.5–5.3)
POTASSIUM SERPL-SCNC: 4.1 MMOL/L — SIGNIFICANT CHANGE UP (ref 3.5–5.3)
SODIUM SERPL-SCNC: 140 MMOL/L — SIGNIFICANT CHANGE UP (ref 135–145)

## 2021-09-09 PROCEDURE — 99232 SBSQ HOSP IP/OBS MODERATE 35: CPT | Mod: GC

## 2021-09-09 PROCEDURE — 93010 ELECTROCARDIOGRAM REPORT: CPT

## 2021-09-09 PROCEDURE — 93454 CORONARY ARTERY ANGIO S&I: CPT | Mod: 26,59

## 2021-09-09 PROCEDURE — 92928 PRQ TCAT PLMT NTRAC ST 1 LES: CPT | Mod: LD

## 2021-09-09 PROCEDURE — 99152 MOD SED SAME PHYS/QHP 5/>YRS: CPT

## 2021-09-09 RX ORDER — TICAGRELOR 90 MG/1
180 TABLET ORAL ONCE
Refills: 0 | Status: COMPLETED | OUTPATIENT
Start: 2021-09-09 | End: 2021-09-09

## 2021-09-09 RX ORDER — TICAGRELOR 90 MG/1
90 TABLET ORAL EVERY 12 HOURS
Refills: 0 | Status: DISCONTINUED | OUTPATIENT
Start: 2021-09-09 | End: 2021-09-10

## 2021-09-09 RX ADMIN — TICAGRELOR 90 MILLIGRAM(S): 90 TABLET ORAL at 21:22

## 2021-09-09 RX ADMIN — TICAGRELOR 180 MILLIGRAM(S): 90 TABLET ORAL at 10:33

## 2021-09-09 RX ADMIN — Medication 81 MILLIGRAM(S): at 10:33

## 2021-09-09 RX ADMIN — ATORVASTATIN CALCIUM 40 MILLIGRAM(S): 80 TABLET, FILM COATED ORAL at 21:22

## 2021-09-09 NOTE — CHART NOTE - NSCHARTNOTEFT_GEN_A_CORE
Removal of Radial Band    Pulses in the right upper extremity are palpable. The patient was placed in the supine position. The insertion site was identified and the band deflated per protocol. The radial band was removed slowly. Direct pressure was applied for  ___0___ minutes/not applicable.      Monitoring of the right wrists and both upper extremities including neuro-vascular checks and vital signs every 15 minutes x 4.    Complications: None    Comments: Pt tolerated well, no hematoma or cyanosis noted.

## 2021-09-09 NOTE — PROGRESS NOTE ADULT - SUBJECTIVE AND OBJECTIVE BOX
Patient is a 71y old  Male who presents with a chief complaint of   9/9/21    HPI:  No CP at rest. Coronary CT positive  C planned    PAST MEDICAL & SURGICAL HISTORY:  HTN (hypertension)    DM (diabetes mellitus)    No significant past surgical history        Review of Systems:   CONSTITUTIONAL: No fever, weight loss, or fatigue  EYES: No eye pain, visual disturbances, or discharge  ENMT:  No difficulty hearing, tinnitus, vertigo; No sinus or throat pain  NECK: No pain or stiffness  BREASTS: No pain, masses, or nipple discharge  RESPIRATORY: No cough, wheezing, chills or hemoptysis; No shortness of breath  CARDIOVASCULAR: No chest pain, palpitations, dizziness, or leg swelling  GASTROINTESTINAL: No abdominal or epigastric pain. No nausea, vomiting, or hematemesis; No diarrhea or constipation. No melena or hematochezia.  GENITOURINARY: No dysuria, frequency, hematuria, or incontinence  NEUROLOGICAL: No headaches, memory loss, loss of strength, numbness, or tremors  SKIN: No itching, burning, rashes, or lesions   LYMPH NODES: No enlarged glands  ENDOCRINE: No heat or cold intolerance; No hair loss  MUSCULOSKELETAL: No joint pain or swelling; No muscle, back, or extremity pain  PSYCHIATRIC: No depression, anxiety, mood swings, or difficulty sleeping  HEME/LYMPH: No easy bruising, or bleeding gums  ALLERY AND IMMUNOLOGIC: No hives or eczema    Allergies    No Known Allergies    Intolerances        Social History:     FAMILY HISTORY:      MEDICATIONS  (STANDING):  aspirin enteric coated 81 milliGRAM(s) Oral daily  atorvastatin 40 milliGRAM(s) Oral at bedtime  dextrose 40% Gel 15 Gram(s) Oral once  dextrose 5%. 1000 milliLiter(s) (50 mL/Hr) IV Continuous <Continuous>  dextrose 5%. 1000 milliLiter(s) (100 mL/Hr) IV Continuous <Continuous>  dextrose 50% Injectable 25 Gram(s) IV Push once  dextrose 50% Injectable 12.5 Gram(s) IV Push once  dextrose 50% Injectable 25 Gram(s) IV Push once  glucagon  Injectable 1 milliGRAM(s) IntraMuscular once  heparin   Injectable 5000 Unit(s) SubCutaneous every 12 hours  insulin lispro (ADMELOG) corrective regimen sliding scale   SubCutaneous three times a day before meals    MEDICATIONS  (PRN):        CAPILLARY BLOOD GLUCOSE      POCT Blood Glucose.: 88 mg/dL (08 Sep 2021 16:13)  POCT Blood Glucose.: 99 mg/dL (08 Sep 2021 11:27)  POCT Blood Glucose.: 84 mg/dL (08 Sep 2021 07:28)  POCT Blood Glucose.: 106 mg/dL (07 Sep 2021 21:16)    I&O's Summary    07 Sep 2021 07:01  -  08 Sep 2021 07:00  --------------------------------------------------------  IN: 0 mL / OUT: 600 mL / NET: -600 mL    08 Sep 2021 07:01  -  08 Sep 2021 18:38  --------------------------------------------------------  IN: 560 mL / OUT: 0 mL / NET: 560 mL        PHYSICAL EXAM:  Vital Signs Last 24 Hrs  T(C): 36.9 (08 Sep 2021 11:20), Max: 36.9 (08 Sep 2021 11:20)  T(F): 98.4 (08 Sep 2021 11:20), Max: 98.4 (08 Sep 2021 11:20)  HR: 68 (08 Sep 2021 11:20) (62 - 68)  BP: 120/70 (08 Sep 2021 11:20) (120/70 - 158/92)  BP(mean): --  RR: 18 (08 Sep 2021 11:20) (18 - 18)  SpO2: 97% (08 Sep 2021 11:20) (97% - 98%)    GENERAL: NAD, well-developed  HEAD:  Atraumatic, Normocephalic  EYES: EOMI, PERRLA, conjunctiva and sclera clear  NECK: Supple, No JVD  CHEST/LUNG: Clear to auscultation bilaterally; No wheeze  HEART: Regular rate and rhythm; No murmurs, rubs, or gallops  ABDOMEN: Soft, Nontender, Nondistended; Bowel sounds present  EXTREMITIES:  2+ Peripheral Pulses, No clubbing, cyanosis, or edema  PSYCH: AAOx3  NEUROLOGY: non-focal  SKIN: No rashes or lesions    LABS:                        14.1   5.73  )-----------( 188      ( 07 Sep 2021 11:08 )             42.2     09-07    142  |  108  |  8   ----------------------------<  104<H>  4.1   |  24  |  0.87    Ca    9.5      07 Sep 2021 11:08  Mg     2.1     09-07    TPro  6.4  /  Alb  3.7  /  TBili  0.3  /  DBili  x   /  AST  23  /  ALT  26  /  AlkPhos  70  09-07    PT/INR - ( 07 Sep 2021 11:08 )   PT: 11.8 sec;   INR: 0.98 ratio         PTT - ( 07 Sep 2021 11:08 )  PTT:31.1 sec          RADIOLOGY & ADDITIONAL TESTS:    Imaging Personally Reviewed:    Consultant(s) Notes Reviewed:      Care Discussed with Consultants/Other Providers:

## 2021-09-09 NOTE — PROGRESS NOTE ADULT - SUBJECTIVE AND OBJECTIVE BOX
Patient is a 71y old  Male who presents with a chief complaint of     SUBJECTIVE / OVERNIGHT EVENTS:  No acute events overnight.  Patient denies active cardiopulmonary symptoms  Ambulated yesterday without difficulty.      MEDICATIONS  (STANDING):  aspirin enteric coated 81 milliGRAM(s) Oral daily  atorvastatin 40 milliGRAM(s) Oral at bedtime  dextrose 40% Gel 15 Gram(s) Oral once  dextrose 5%. 1000 milliLiter(s) (50 mL/Hr) IV Continuous <Continuous>  dextrose 5%. 1000 milliLiter(s) (100 mL/Hr) IV Continuous <Continuous>  dextrose 50% Injectable 25 Gram(s) IV Push once  dextrose 50% Injectable 12.5 Gram(s) IV Push once  dextrose 50% Injectable 25 Gram(s) IV Push once  glucagon  Injectable 1 milliGRAM(s) IntraMuscular once  heparin   Injectable 5000 Unit(s) SubCutaneous every 12 hours  insulin lispro (ADMELOG) corrective regimen sliding scale   SubCutaneous three times a day before meals    MEDICATIONS  (PRN):      T(C): 36.7 (09-09-21 @ 04:20), Max: 36.9 (09-08-21 @ 11:20)  HR: 55 (09-09-21 @ 04:20) (55 - 71)  BP: 157/75 (09-09-21 @ 04:20) (120/70 - 157/75)  RR: 18 (09-09-21 @ 04:20) (17 - 18)  SpO2: 96% (09-09-21 @ 04:20) (95% - 97%)  CAPILLARY BLOOD GLUCOSE      POCT Blood Glucose.: 84 mg/dL (09 Sep 2021 07:25)  POCT Blood Glucose.: 104 mg/dL (08 Sep 2021 20:57)  POCT Blood Glucose.: 88 mg/dL (08 Sep 2021 16:13)  POCT Blood Glucose.: 99 mg/dL (08 Sep 2021 11:27)    I&O's Summary    08 Sep 2021 07:01  -  09 Sep 2021 07:00  --------------------------------------------------------  IN: 560 mL / OUT: 0 mL / NET: 560 mL        PHYSICAL EXAM:  Appearance: NAD  Eyes: PERRL, EOMI  HENT: Normal oral muscosa, NC/AT  Cardiovascular: normal S1 and S2, RRR, diastolic murmur best heard at RUSB, no edema  Respiratory: Clear to auscultation bilaterally  Gastrointestinal: Soft, non-tender, non-distended, BS+  Musculoskeletal: No clubbing, no joint deformity   Neurologic: Non-focal  Lymphatic: No lymphadenopathy  Psychiatry: AAOx3, mood & affect appropriate  Skin: No rashes, no ecchymoses, no cyanosis      LABS:                        14.1   5.73  )-----------( 188      ( 07 Sep 2021 11:08 )             42.2     09-09    140  |  105  |  14  ----------------------------<  88  4.1   |  25  |  1.07    Ca    9.5      09 Sep 2021 06:10  Mg     2.1     09-07    TPro  6.4  /  Alb  3.7  /  TBili  0.3  /  DBili  x   /  AST  23  /  ALT  26  /  AlkPhos  70  09-07    PT/INR - ( 07 Sep 2021 11:08 )   PT: 11.8 sec;   INR: 0.98 ratio         PTT - ( 07 Sep 2021 11:08 )  PTT:31.1 sec          RADIOLOGY & ADDITIONAL TESTS:    Imaging Personally Reviewed: SCARLETT    Consultant(s) Notes Reviewed:  SCARLETT    Care Discussed with Consultants/Other Providers: SCARLETT     SUBJECTIVE / OVERNIGHT EVENTS:  No acute events overnight.  Patient denies active cardiopulmonary symptoms  Ambulated yesterday without difficulty.      MEDICATIONS  (STANDING):  aspirin enteric coated 81 milliGRAM(s) Oral daily  atorvastatin 40 milliGRAM(s) Oral at bedtime  dextrose 40% Gel 15 Gram(s) Oral once  dextrose 5%. 1000 milliLiter(s) (50 mL/Hr) IV Continuous <Continuous>  dextrose 5%. 1000 milliLiter(s) (100 mL/Hr) IV Continuous <Continuous>  dextrose 50% Injectable 25 Gram(s) IV Push once  dextrose 50% Injectable 12.5 Gram(s) IV Push once  dextrose 50% Injectable 25 Gram(s) IV Push once  glucagon  Injectable 1 milliGRAM(s) IntraMuscular once  heparin   Injectable 5000 Unit(s) SubCutaneous every 12 hours  insulin lispro (ADMELOG) corrective regimen sliding scale   SubCutaneous three times a day before meals      T(C): 36.7 (09-09-21 @ 04:20), Max: 36.9 (09-08-21 @ 11:20)  HR: 55 (09-09-21 @ 04:20) (55 - 71)  BP: 157/75 (09-09-21 @ 04:20) (120/70 - 157/75)  RR: 18 (09-09-21 @ 04:20) (17 - 18)  SpO2: 96% (09-09-21 @ 04:20) (95% - 97%)      PHYSICAL EXAM:  Appearance: NAD  Eyes: PERRL, EOMI  HENT: Normal oral muscosa, NC/AT  Cardiovascular: normal S1 and S2, RRR, diastolic murmur best heard at RUSB, no edema  Respiratory: Clear to auscultation bilaterally  Gastrointestinal: Soft, non-tender, non-distended, BS+  Musculoskeletal: No clubbing, no joint deformity   Neurologic: Non-focal  Lymphatic: No lymphadenopathy  Psychiatry: AAOx3, mood & affect appropriate  Skin: No rashes, no ecchymoses, no cyanosis        LABS:                      14.1   5.73  )-----------( 188      ( 07 Sep 2021 11:08 )             42.2     09-09  140  |  105  |  14  ----------------------------<  88  4.1   |  25  |  1.07    Ca    9.5      09 Sep 2021 06:10  Mg     2.1     09-07    TPro  6.4  /  Alb  3.7  /  TBili  0.3  /  DBili  x   /  AST  23  /  ALT  26  /  AlkPhos  70  09-07    PT/INR - ( 07 Sep 2021 11:08 )   PT: 11.8 sec;   INR: 0.98 ratio    PTT - ( 07 Sep 2021 11:08 )  PTT:31.1 sec

## 2021-09-09 NOTE — PROGRESS NOTE ADULT - ASSESSMENT
72 y/o M PMH HTN DM, presents to ED sent by cardiologist Dr Manish Chan for ACS evaluation      #1.   Chest pain, - patient currently remains free of symptoms.  Document pt. brought in reviewed (9/7/21 Note to go with EMS):   - TTE on 7/2/2021 showed normal LV dimensions with normal systolic function EF 65%. mild AR.  - Nuc stress test (pharmacologic) 7/13/21 showed no evidence of ischemia by perfusion criteria  - EKG on 7/2/21 showing TwI in lead V3-V6. But EKG in the ED without acute ischemic changes, no TwI, no ST deviation fitting STEMI criteria.  - Trop 8 -> 8.  - monitor on tele.  - monitor electrolytes  - CCTA showed severe mLAD lesion and other non-obstructing lesion in multiple coronary arteries with CAC score > 500.   - Discussed with interventional card attending. plan for LHC today. Keep NPO until procedure.      Jacquie Mayer M.D.  Cardiology fellow   72 y/o M PMH HTN DM, presents to ED sent by cardiologist Dr Manish Chan for ACS evaluation      #1.   Chest pain, - patient currently remains free of symptoms.  Document pt. brought in reviewed (9/7/21 Note to go with EMS):   - TTE on 7/2/2021 showed normal LV dimensions with normal systolic function EF 65%. mild AR.  - Nuc stress test (pharmacologic) 7/13/21 showed no evidence of ischemia by perfusion criteria  - EKG on 7/2/21 showing TwI in lead V3-V6. But EKG in the ED without acute ischemic changes, no TwI, no ST deviation fitting STEMI criteria.  - Trop 8 -> 8.  - monitor on tele.  - monitor electrolytes  - CCTA showed severe mLAD lesion and other non-obstructing lesion in multiple coronary arteries with CAC score > 500.   - Discussed with interventional card attending. plan for LHC today. Keep NPO until procedure.  - please load patient with Brilinta or Plavix and continue maintenance dose starting tmrw. Will follow up with cath result.      Jacquie Mayer M.D.  Cardiology fellow   70 y/o M PMH HTN DM, presents to ED sent by cardiologist Dr Manish Chan for ACS evaluation    REC:  #1.   Chest pain, - patient currently remains free of symptoms.  - CCTA showed severe mLAD lesion and other non-obstructing lesion in multiple coronary arteries with CAC score > 500.   - Discussed with interventional card attending. plan for C today. Keep NPO until procedure.  - please load patient with Brilinta or Plavix and continue maintenance dose starting tmrw. Will follow up with cath result.    #2.  HTN  - continue to monitor BP    #3.  DM  - continue insulin coverage      Jacquie Mayer M.D.  Cardiology fellow    Plan discussed with cardiology fellow; patient seen and examined.       I was physically present for the key portions of the evaluation and management (E/M) service provided.    I agree with the above history, physical, and plan which I have reviewed and edited where appropriate.   Keshav Garcia M.D.  Cardiology Attending, Consult Service    For Cardiology consults and questions, all Cardiology service information can be found 24/7 on [email or web address], password: [email or web address]

## 2021-09-10 VITALS
DIASTOLIC BLOOD PRESSURE: 84 MMHG | OXYGEN SATURATION: 97 % | RESPIRATION RATE: 18 BRPM | HEART RATE: 69 BPM | SYSTOLIC BLOOD PRESSURE: 138 MMHG | TEMPERATURE: 98 F

## 2021-09-10 LAB
ALBUMIN SERPL ELPH-MCNC: 3.8 G/DL — SIGNIFICANT CHANGE UP (ref 3.3–5)
ALP SERPL-CCNC: 77 U/L — SIGNIFICANT CHANGE UP (ref 40–120)
ALT FLD-CCNC: 22 U/L — SIGNIFICANT CHANGE UP (ref 10–45)
ANION GAP SERPL CALC-SCNC: 13 MMOL/L — SIGNIFICANT CHANGE UP (ref 5–17)
AST SERPL-CCNC: 26 U/L — SIGNIFICANT CHANGE UP (ref 10–40)
BASOPHILS # BLD AUTO: 0.04 K/UL — SIGNIFICANT CHANGE UP (ref 0–0.2)
BASOPHILS NFR BLD AUTO: 0.7 % — SIGNIFICANT CHANGE UP (ref 0–2)
BILIRUB SERPL-MCNC: 1.1 MG/DL — SIGNIFICANT CHANGE UP (ref 0.2–1.2)
BUN SERPL-MCNC: 12 MG/DL — SIGNIFICANT CHANGE UP (ref 7–23)
CALCIUM SERPL-MCNC: 9.3 MG/DL — SIGNIFICANT CHANGE UP (ref 8.4–10.5)
CHLORIDE SERPL-SCNC: 105 MMOL/L — SIGNIFICANT CHANGE UP (ref 96–108)
CO2 SERPL-SCNC: 20 MMOL/L — LOW (ref 22–31)
CREAT SERPL-MCNC: 0.88 MG/DL — SIGNIFICANT CHANGE UP (ref 0.5–1.3)
EOSINOPHIL # BLD AUTO: 0.26 K/UL — SIGNIFICANT CHANGE UP (ref 0–0.5)
EOSINOPHIL NFR BLD AUTO: 4.7 % — SIGNIFICANT CHANGE UP (ref 0–6)
GLUCOSE BLDC GLUCOMTR-MCNC: 126 MG/DL — HIGH (ref 70–99)
GLUCOSE BLDC GLUCOMTR-MCNC: 92 MG/DL — SIGNIFICANT CHANGE UP (ref 70–99)
GLUCOSE SERPL-MCNC: 74 MG/DL — SIGNIFICANT CHANGE UP (ref 70–99)
HCT VFR BLD CALC: 47.4 % — SIGNIFICANT CHANGE UP (ref 39–50)
HGB BLD-MCNC: 16.1 G/DL — SIGNIFICANT CHANGE UP (ref 13–17)
IMM GRANULOCYTES NFR BLD AUTO: 0.2 % — SIGNIFICANT CHANGE UP (ref 0–1.5)
LYMPHOCYTES # BLD AUTO: 1.97 K/UL — SIGNIFICANT CHANGE UP (ref 1–3.3)
LYMPHOCYTES # BLD AUTO: 35.7 % — SIGNIFICANT CHANGE UP (ref 13–44)
MAGNESIUM SERPL-MCNC: 2 MG/DL — SIGNIFICANT CHANGE UP (ref 1.6–2.6)
MCHC RBC-ENTMCNC: 30.1 PG — SIGNIFICANT CHANGE UP (ref 27–34)
MCHC RBC-ENTMCNC: 34 GM/DL — SIGNIFICANT CHANGE UP (ref 32–36)
MCV RBC AUTO: 88.6 FL — SIGNIFICANT CHANGE UP (ref 80–100)
MONOCYTES # BLD AUTO: 0.64 K/UL — SIGNIFICANT CHANGE UP (ref 0–0.9)
MONOCYTES NFR BLD AUTO: 11.6 % — SIGNIFICANT CHANGE UP (ref 2–14)
NEUTROPHILS # BLD AUTO: 2.6 K/UL — SIGNIFICANT CHANGE UP (ref 1.8–7.4)
NEUTROPHILS NFR BLD AUTO: 47.1 % — SIGNIFICANT CHANGE UP (ref 43–77)
NRBC # BLD: 0 /100 WBCS — SIGNIFICANT CHANGE UP (ref 0–0)
PLATELET # BLD AUTO: 207 K/UL — SIGNIFICANT CHANGE UP (ref 150–400)
POTASSIUM SERPL-MCNC: 3.9 MMOL/L — SIGNIFICANT CHANGE UP (ref 3.5–5.3)
POTASSIUM SERPL-SCNC: 3.9 MMOL/L — SIGNIFICANT CHANGE UP (ref 3.5–5.3)
PROT SERPL-MCNC: 7 G/DL — SIGNIFICANT CHANGE UP (ref 6–8.3)
RBC # BLD: 5.35 M/UL — SIGNIFICANT CHANGE UP (ref 4.2–5.8)
RBC # FLD: 12.8 % — SIGNIFICANT CHANGE UP (ref 10.3–14.5)
SODIUM SERPL-SCNC: 138 MMOL/L — SIGNIFICANT CHANGE UP (ref 135–145)
WBC # BLD: 5.52 K/UL — SIGNIFICANT CHANGE UP (ref 3.8–10.5)
WBC # FLD AUTO: 5.52 K/UL — SIGNIFICANT CHANGE UP (ref 3.8–10.5)

## 2021-09-10 PROCEDURE — C1874: CPT

## 2021-09-10 PROCEDURE — C1769: CPT

## 2021-09-10 PROCEDURE — 99232 SBSQ HOSP IP/OBS MODERATE 35: CPT | Mod: GC

## 2021-09-10 PROCEDURE — 85610 PROTHROMBIN TIME: CPT

## 2021-09-10 PROCEDURE — 93005 ELECTROCARDIOGRAM TRACING: CPT

## 2021-09-10 PROCEDURE — 83036 HEMOGLOBIN GLYCOSYLATED A1C: CPT

## 2021-09-10 PROCEDURE — U0005: CPT

## 2021-09-10 PROCEDURE — U0003: CPT

## 2021-09-10 PROCEDURE — 93454 CORONARY ARTERY ANGIO S&I: CPT | Mod: 59

## 2021-09-10 PROCEDURE — 75574 CT ANGIO HRT W/3D IMAGE: CPT

## 2021-09-10 PROCEDURE — C1894: CPT

## 2021-09-10 PROCEDURE — 80048 BASIC METABOLIC PNL TOTAL CA: CPT

## 2021-09-10 PROCEDURE — 80061 LIPID PANEL: CPT

## 2021-09-10 PROCEDURE — 99152 MOD SED SAME PHYS/QHP 5/>YRS: CPT

## 2021-09-10 PROCEDURE — 71046 X-RAY EXAM CHEST 2 VIEWS: CPT

## 2021-09-10 PROCEDURE — 83880 ASSAY OF NATRIURETIC PEPTIDE: CPT

## 2021-09-10 PROCEDURE — 83735 ASSAY OF MAGNESIUM: CPT

## 2021-09-10 PROCEDURE — 86803 HEPATITIS C AB TEST: CPT

## 2021-09-10 PROCEDURE — C9600: CPT | Mod: LD

## 2021-09-10 PROCEDURE — 82962 GLUCOSE BLOOD TEST: CPT

## 2021-09-10 PROCEDURE — 85025 COMPLETE CBC W/AUTO DIFF WBC: CPT

## 2021-09-10 PROCEDURE — 93306 TTE W/DOPPLER COMPLETE: CPT

## 2021-09-10 PROCEDURE — 80053 COMPREHEN METABOLIC PANEL: CPT

## 2021-09-10 PROCEDURE — C1887: CPT

## 2021-09-10 PROCEDURE — 84484 ASSAY OF TROPONIN QUANT: CPT

## 2021-09-10 PROCEDURE — 99285 EMERGENCY DEPT VISIT HI MDM: CPT | Mod: 25

## 2021-09-10 PROCEDURE — 86769 SARS-COV-2 COVID-19 ANTIBODY: CPT

## 2021-09-10 PROCEDURE — 85730 THROMBOPLASTIN TIME PARTIAL: CPT

## 2021-09-10 RX ORDER — TICAGRELOR 90 MG/1
1 TABLET ORAL
Qty: 60 | Refills: 0
Start: 2021-09-10 | End: 2021-10-09

## 2021-09-10 RX ORDER — METOPROLOL TARTRATE 50 MG
1 TABLET ORAL
Qty: 30 | Refills: 0
Start: 2021-09-10 | End: 2021-10-09

## 2021-09-10 RX ORDER — ASPIRIN/CALCIUM CARB/MAGNESIUM 324 MG
1 TABLET ORAL
Qty: 30 | Refills: 0
Start: 2021-09-10 | End: 2021-10-09

## 2021-09-10 RX ORDER — METOPROLOL TARTRATE 50 MG
25 TABLET ORAL DAILY
Refills: 0 | Status: DISCONTINUED | OUTPATIENT
Start: 2021-09-10 | End: 2021-09-10

## 2021-09-10 RX ORDER — ATORVASTATIN CALCIUM 80 MG/1
1 TABLET, FILM COATED ORAL
Qty: 30 | Refills: 0
Start: 2021-09-10 | End: 2021-10-09

## 2021-09-10 RX ADMIN — Medication 81 MILLIGRAM(S): at 10:53

## 2021-09-10 RX ADMIN — HEPARIN SODIUM 5000 UNIT(S): 5000 INJECTION INTRAVENOUS; SUBCUTANEOUS at 06:33

## 2021-09-10 RX ADMIN — Medication 25 MILLIGRAM(S): at 12:43

## 2021-09-10 RX ADMIN — TICAGRELOR 90 MILLIGRAM(S): 90 TABLET ORAL at 08:54

## 2021-09-10 NOTE — PROGRESS NOTE ADULT - PROVIDER SPECIALTY LIST ADULT
Cardiology
Intervent Cardiology
Cardiology
Cardiology
Internal Medicine

## 2021-09-10 NOTE — DISCHARGE NOTE PROVIDER - HOSPITAL COURSE
72 y/o M PMH HTN DM presents to ED sent by cardiologist dr Manish newman for EKG changes during nuclear stress test today , developed TWI in anterior leads (V3). pt notes intermittent L sided chest pain for the past few days, sometimes w/ SOB, sometimes radiating to neck / back. Denies cp this morning during test or now.    Dx:	Atypical CP - trops neg x2 (8-->8), TTE with normal EF and mild AR, CT coronaries w/ severe stenosis of the mid LAD s/p cath 9/9 1 SERA to mid LAD via RRA     Cleared for discharge by cardiology, will need to follow up closely as outpatient.    72 y/o M PMH HTN DM presents to ED sent by cardiologist dr Manish newman for EKG changes during nuclear stress test today , developed TWI in anterior leads (V3). pt notes intermittent L sided chest pain for the past few days, sometimes w/ SOB, sometimes radiating to neck / back. Denies cp this morning during test or now.    Dx:	Atypical CP - trops neg x2 (8-->8), TTE with normal EF and mild AR, CT coronaries w/ severe stenosis of the mid LAD s/p cath 9/9 1 SERA to mid LAD via RRA     Medications sent to [facility] and insurance coverage verified - copay for Brilinta $9.    Cleared for discharge by cardiology, will need to follow up closely as outpatient.

## 2021-09-10 NOTE — DISCHARGE NOTE NURSING/CASE MANAGEMENT/SOCIAL WORK - NSDCFUADDAPPT_GEN_ALL_CORE_FT
APPTS ARE READY TO BE MADE: [x] YES    Best Family or Patient Contact (if needed): Patient [phone]    Additional Information about above appointments (if needed):    1: Follow up with cardiology in 1 week  2: Follow up with PCP in 2 weeks  3:     Other comments or requests:
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
## 2021-09-10 NOTE — PROGRESS NOTE ADULT - SUBJECTIVE AND OBJECTIVE BOX
Interventional Cardiology Post-PCI Follow up     Overnight Events:   Pt feels well, no chest pain, pressure, shortness of breath or palpitation.  Tele with SR in the 60-80s few PVCs    REVIEW OF SYSTEMS:  Constitutional:     [x ] negative [ ] fevers [ ] chills [ ] weight loss [ ] weight gain  HEENT:                  [x ] negative [ ] dry eyes [ ] eye irritation [ ] postnasal drip [ ] nasal congestion  CV:                         [ x] negative  [ ] chest pain [ ] orthopnea [ ] palpitations [ ] murmur  Resp:                     [ x] negative [ ] cough [ ] shortness of breath [ ] dyspnea [ ] wheezing [ ] sputum [ ]hemoptysis  GI:                          [ x] negative [ ] nausea [ ] vomiting [ ] diarrhea [ ] constipation [ ] abd pain [ ] dysphagia   :                        [ x] negative [ ] dysuria [ ] nocturia [ ] hematuria [ ] increased urinary frequency  Musculoskeletal: [ x] negative [ ] back pain [ ] myalgias [ ] arthralgias [ ] fracture  Skin:                       [ x] negative [ ] rash [ ] itch  Neurological:        [x ] negative [ ] headache [ ] dizziness [ ] syncope [ ] weakness [ ] numbness  Psychiatric:           [ x] negative [ ] anxiety [ ] depression  Endocrine:            [ x] negative [ ] diabetes [ ] thyroid problem  Heme/Lymph:      [ x] negative [ ] anemia [ ] bleeding problem  Allergic/Immune: [ x] negative [ ] itchy eyes [ ] nasal discharge [ ] hives [ ] angioedema    [ x] All other systems negative  [ ] Unable to assess ROS due to    Current Meds:  aspirin enteric coated 81 milliGRAM(s) Oral daily  atorvastatin 40 milliGRAM(s) Oral at bedtime  dextrose 40% Gel 15 Gram(s) Oral once  dextrose 5%. 1000 milliLiter(s) IV Continuous <Continuous>  dextrose 5%. 1000 milliLiter(s) IV Continuous <Continuous>  dextrose 50% Injectable 25 Gram(s) IV Push once  dextrose 50% Injectable 12.5 Gram(s) IV Push once  dextrose 50% Injectable 25 Gram(s) IV Push once  glucagon  Injectable 1 milliGRAM(s) IntraMuscular once  heparin   Injectable 5000 Unit(s) SubCutaneous every 12 hours  insulin lispro (ADMELOG) corrective regimen sliding scale   SubCutaneous three times a day before meals  ticagrelor 90 milliGRAM(s) Oral every 12 hours      PAST MEDICAL & SURGICAL HISTORY:  HTN (hypertension)    DM (diabetes mellitus)    No significant past surgical history        Vitals:  T(F): 98.1 (09-10), Max: 98.6 (09-09)  HR: 67 (09-10) (57 - 90)  BP: 142/84 (09-10) (135/85 - 191/87)  RR: 18 (09-10)  SpO2: 99% (09-10)  I&O's Summary    09 Sep 2021 07:01  -  10 Sep 2021 07:00  --------------------------------------------------------  IN: 0 mL / OUT: 500 mL / NET: -500 mL        Physical Exam:  Appearance: No acute distress  HENT: No JVD   Cardiovascular: RRR, S1/S2,+mild diastolic murmur   Respiratory: CTABL  Gastrointestinal: soft, NT ND, +BS  Musculoskeletal: No clubbing, no edema   Neurologic: Non-focal  Skin: No rashes, ecchymoses, or cyanosis    RRA site clean, dry, no hematoma, ecchymosis or tenderness. intact sensation. pressure dressings removed                         16.1   5.52  )-----------( 207      ( 10 Sep 2021 07:06 )             47.4     09-10    138  |  105  |  12  ----------------------------<  74  3.9   |  20<L>  |  0.88    Ca    9.3      10 Sep 2021 07:06  Mg     2.0     09-10    TPro  7.0  /  Alb  3.8  /  TBili  1.1  /  DBili  x   /  AST  26  /  ALT  22  /  AlkPhos  77  09-10          Serum Pro-Brain Natriuretic Peptide: 114 pg/mL (09-07 @ 11:08)          Cardiovascular Testings:     TTE   Conclusions:  1. Normal mitral valve. No mitral regurgitation seen.  2. Calcified trileaflet aortic valve with normal opening.  Mild aortic regurgitation.  3. Aortic Root: 3.6 cm.  4. Normal left ventricular systolic function. No segmental  wall motion abnormalities.  5. Normal right ventricular size and function.      Interpretation of Telemetry: SR 60-70, few PVCs

## 2021-09-10 NOTE — DISCHARGE NOTE PROVIDER - NSDCFUADDINST_GEN_ALL_CORE_FT
Do not use affected arm to lift, push, or pull more than 2-3 pounds for the next 7 days. Do not participate in strenuous activities for 4 days after your procedure. This includes most sports including jogging, golfing, playing tennis, and bowling.

## 2021-09-10 NOTE — PROGRESS NOTE ADULT - SUBJECTIVE AND OBJECTIVE BOX
Patient is a 71y old  Male who presents with a chief complaint of     SUBJECTIVE / OVERNIGHT EVENTS:  no acute events overnight.  no acute cardiopulmonary symptoms     MEDICATIONS  (STANDING):  aspirin enteric coated 81 milliGRAM(s) Oral daily  atorvastatin 40 milliGRAM(s) Oral at bedtime  dextrose 40% Gel 15 Gram(s) Oral once  dextrose 5%. 1000 milliLiter(s) (50 mL/Hr) IV Continuous <Continuous>  dextrose 5%. 1000 milliLiter(s) (100 mL/Hr) IV Continuous <Continuous>  dextrose 50% Injectable 25 Gram(s) IV Push once  dextrose 50% Injectable 12.5 Gram(s) IV Push once  dextrose 50% Injectable 25 Gram(s) IV Push once  glucagon  Injectable 1 milliGRAM(s) IntraMuscular once  heparin   Injectable 5000 Unit(s) SubCutaneous every 12 hours  insulin lispro (ADMELOG) corrective regimen sliding scale   SubCutaneous three times a day before meals  ticagrelor 90 milliGRAM(s) Oral every 12 hours    MEDICATIONS  (PRN):      T(C): 36.7 (09-10-21 @ 04:57), Max: 37 (09-09-21 @ 15:55)  HR: 67 (09-10-21 @ 04:57) (57 - 90)  BP: 142/84 (09-10-21 @ 04:57) (135/85 - 191/87)  RR: 18 (09-10-21 @ 04:57) (14 - 18)  SpO2: 99% (09-10-21 @ 04:57) (94% - 99%)  CAPILLARY BLOOD GLUCOSE      POCT Blood Glucose.: 92 mg/dL (10 Sep 2021 07:28)  POCT Blood Glucose.: 134 mg/dL (09 Sep 2021 21:11)  POCT Blood Glucose.: 74 mg/dL (09 Sep 2021 16:46)  POCT Blood Glucose.: 82 mg/dL (09 Sep 2021 11:35)    I&O's Summary    09 Sep 2021 07:01  -  10 Sep 2021 07:00  --------------------------------------------------------  IN: 0 mL / OUT: 500 mL / NET: -500 mL    PHYSICAL EXAM:  Appearance: NAD  Eyes: PERRL, EOMI  HENT: Normal oral muscosa, NC/AT  Cardiovascular: normal S1 and S2, RRR, diastolic murmur best heard at RUSB, no edema  procedure site: c/d/i, distal pulse and motor/sensory function intact.  Respiratory: Clear to auscultation bilaterally  Gastrointestinal: Soft, non-tender, non-distended, BS+  Musculoskeletal: No clubbing, no joint deformity   Neurologic: Non-focal  Lymphatic: No lymphadenopathy  Psychiatry: AAOx3, mood & affect appropriate  Skin: No rashes, no ecchymoses, no cyanosis      LABS:                        16.1   5.52  )-----------( 207      ( 10 Sep 2021 07:06 )             47.4     09-09    140  |  105  |  14  ----------------------------<  88  4.1   |  25  |  1.07    Ca    9.5      09 Sep 2021 06:10                RADIOLOGY & ADDITIONAL TESTS:    Imaging Personally Reviewed: SCARLETT    Consultant(s) Notes Reviewed:  SCARLETT    Care Discussed with Consultants/Other Providers: SCARLETT     SUBJECTIVE / OVERNIGHT EVENTS:  no acute events overnight.  no acute cardiopulmonary symptoms       MEDICATIONS  (STANDING):  aspirin enteric coated 81 milliGRAM(s) Oral daily  atorvastatin 40 milliGRAM(s) Oral at bedtime  dextrose 40% Gel 15 Gram(s) Oral once  dextrose 5%. 1000 milliLiter(s) (50 mL/Hr) IV Continuous <Continuous>  dextrose 5%. 1000 milliLiter(s) (100 mL/Hr) IV Continuous <Continuous>  dextrose 50% Injectable 25 Gram(s) IV Push once  dextrose 50% Injectable 12.5 Gram(s) IV Push once  dextrose 50% Injectable 25 Gram(s) IV Push once  glucagon  Injectable 1 milliGRAM(s) IntraMuscular once  heparin   Injectable 5000 Unit(s) SubCutaneous every 12 hours  insulin lispro (ADMELOG) corrective regimen sliding scale   SubCutaneous three times a day before meals  ticagrelor 90 milliGRAM(s) Oral every 12 hours      T(C): 36.7 (09-10-21 @ 04:57), Max: 37 (09-09-21 @ 15:55)  HR: 67 (09-10-21 @ 04:57) (57 - 90)  BP: 142/84 (09-10-21 @ 04:57) (135/85 - 191/87)  RR: 18 (09-10-21 @ 04:57) (14 - 18)  SpO2: 99% (09-10-21 @ 04:57) (94% - 99%)  CAPILLARY BLOOD GLUCOSE    PHYSICAL EXAM:  Appearance: NAD  Eyes: PERRL, EOMI  HENT: Normal oral muscosa, NC/AT  Cardiovascular: normal S1 and S2, RRR, diastolic murmur best heard at RUSB, no edema  procedure site: c/d/i, distal pulse and motor/sensory function intact.  Respiratory: Clear to auscultation bilaterally  Gastrointestinal: Soft, non-tender, non-distended, BS+  Musculoskeletal: No clubbing, no joint deformity   Neurologic: Non-focal  Lymphatic: No lymphadenopathy  Psychiatry: AAOx3, mood & affect appropriate  Skin: No rashes, no ecchymoses, no cyanosis      LABS:                      16.1   5.52  )-----------( 207      ( 10 Sep 2021 07:06 )             47.4     09-09  140  |  105  |  14  ----------------------------<  88  4.1   |  25  |  1.07    Ca    9.5      09 Sep 2021 06:10

## 2021-09-10 NOTE — DISCHARGE NOTE PROVIDER - PROVIDER TOKENS
PROVIDER:[TOKEN:[[id number]],FOLLOWUP:[1 week],ESTABLISHEDPATIENT:[T]],PROVIDER:[TOKEN:[[id number]],FOLLOWUP:[1 week],ESTABLISHEDPATIENT:[T]]

## 2021-09-10 NOTE — DISCHARGE NOTE NURSING/CASE MANAGEMENT/SOCIAL WORK - NSDCPEFALRISK_GEN_ALL_CORE
For information on Fall & injury Prevention, visit [email or web address]

## 2021-09-10 NOTE — DISCHARGE NOTE NURSING/CASE MANAGEMENT/SOCIAL WORK - PATIENT PORTAL LINK FT
You can access the FollowMyHealth Patient Portal offered by [facility] by registering at the following website: [email or web address]. By joining [facility]’s FollowMyHealth portal, you will also be able to view your health information using other applications (apps) compatible with our system.

## 2021-09-10 NOTE — DISCHARGE NOTE PROVIDER - NSDCFUADDAPPT_GEN_ALL_CORE_FT
APPTS ARE READY TO BE MADE: [x] YES    Best Family or Patient Contact (if needed): Patient [phone]    Additional Information about above appointments (if needed):    1: Follow up with cardiology in 1 week  2: Follow up with PCP in 2 weeks  3:     Other comments or requests:    APPTS ARE READY TO BE MADE: [x] YES    Best Family or Patient Contact (if needed): Patient [phone]    Additional Information about above appointments (if needed):    1: Follow up with cardiology in 1 week  2: Follow up with PCP in 2 weeks  3:     Other comments or requests:   Patient was scheduled with Manish Watson on 09/14/2021 at [address] [facility] [address]. Patient advised of appointment details.     Patient was scheduled with Emery Lopez on 09/16/2021 1:00pm at [address] [place] [address]. Patient advised of appointment details.

## 2021-09-10 NOTE — PROGRESS NOTE ADULT - SUBJECTIVE AND OBJECTIVE BOX
Patient is a 71y old  Male who presents with a chief complaint of     9/10/21    HPI:  S/p PCI to Mid LAD    PAST MEDICAL & SURGICAL HISTORY:  HTN (hypertension)    DM (diabetes mellitus)    No significant past surgical history        Review of Systems:   CONSTITUTIONAL: No fever, weight loss, or fatigue  EYES: No eye pain, visual disturbances, or discharge  ENMT:  No difficulty hearing, tinnitus, vertigo; No sinus or throat pain  NECK: No pain or stiffness  BREASTS: No pain, masses, or nipple discharge  RESPIRATORY: No cough, wheezing, chills or hemoptysis; No shortness of breath  CARDIOVASCULAR: No chest pain, palpitations, dizziness, or leg swelling  GASTROINTESTINAL: No abdominal or epigastric pain. No nausea, vomiting, or hematemesis; No diarrhea or constipation. No melena or hematochezia.  GENITOURINARY: No dysuria, frequency, hematuria, or incontinence  NEUROLOGICAL: No headaches, memory loss, loss of strength, numbness, or tremors  SKIN: No itching, burning, rashes, or lesions   LYMPH NODES: No enlarged glands  ENDOCRINE: No heat or cold intolerance; No hair loss  MUSCULOSKELETAL: No joint pain or swelling; No muscle, back, or extremity pain  PSYCHIATRIC: No depression, anxiety, mood swings, or difficulty sleeping  HEME/LYMPH: No easy bruising, or bleeding gums  ALLERY AND IMMUNOLOGIC: No hives or eczema    Allergies    No Known Allergies    Intolerances        Social History:     FAMILY HISTORY:      MEDICATIONS  (STANDING):  aspirin enteric coated 81 milliGRAM(s) Oral daily  atorvastatin 40 milliGRAM(s) Oral at bedtime  dextrose 40% Gel 15 Gram(s) Oral once  dextrose 5%. 1000 milliLiter(s) (50 mL/Hr) IV Continuous <Continuous>  dextrose 5%. 1000 milliLiter(s) (100 mL/Hr) IV Continuous <Continuous>  dextrose 50% Injectable 25 Gram(s) IV Push once  dextrose 50% Injectable 12.5 Gram(s) IV Push once  dextrose 50% Injectable 25 Gram(s) IV Push once  glucagon  Injectable 1 milliGRAM(s) IntraMuscular once  heparin   Injectable 5000 Unit(s) SubCutaneous every 12 hours  insulin lispro (ADMELOG) corrective regimen sliding scale   SubCutaneous three times a day before meals    MEDICATIONS  (PRN):        CAPILLARY BLOOD GLUCOSE      POCT Blood Glucose.: 88 mg/dL (08 Sep 2021 16:13)  POCT Blood Glucose.: 99 mg/dL (08 Sep 2021 11:27)  POCT Blood Glucose.: 84 mg/dL (08 Sep 2021 07:28)  POCT Blood Glucose.: 106 mg/dL (07 Sep 2021 21:16)    I&O's Summary    07 Sep 2021 07:01  -  08 Sep 2021 07:00  --------------------------------------------------------  IN: 0 mL / OUT: 600 mL / NET: -600 mL    08 Sep 2021 07:01  -  08 Sep 2021 18:38  --------------------------------------------------------  IN: 560 mL / OUT: 0 mL / NET: 560 mL        PHYSICAL EXAM:  Vital Signs Last 24 Hrs  T(C): 36.9 (08 Sep 2021 11:20), Max: 36.9 (08 Sep 2021 11:20)  T(F): 98.4 (08 Sep 2021 11:20), Max: 98.4 (08 Sep 2021 11:20)  HR: 68 (08 Sep 2021 11:20) (62 - 68)  BP: 120/70 (08 Sep 2021 11:20) (120/70 - 158/92)  BP(mean): --  RR: 18 (08 Sep 2021 11:20) (18 - 18)  SpO2: 97% (08 Sep 2021 11:20) (97% - 98%)    GENERAL: NAD, well-developed  HEAD:  Atraumatic, Normocephalic  EYES: EOMI, PERRLA, conjunctiva and sclera clear  NECK: Supple, No JVD  CHEST/LUNG: Clear to auscultation bilaterally; No wheeze  HEART: Regular rate and rhythm; No murmurs, rubs, or gallops  ABDOMEN: Soft, Nontender, Nondistended; Bowel sounds present  EXTREMITIES:  2+ Peripheral Pulses, No clubbing, cyanosis, or edema  PSYCH: AAOx3  NEUROLOGY: non-focal  SKIN: No rashes or lesions    LABS:                        14.1   5.73  )-----------( 188      ( 07 Sep 2021 11:08 )             42.2     09-07    142  |  108  |  8   ----------------------------<  104<H>  4.1   |  24  |  0.87    Ca    9.5      07 Sep 2021 11:08  Mg     2.1     09-07    TPro  6.4  /  Alb  3.7  /  TBili  0.3  /  DBili  x   /  AST  23  /  ALT  26  /  AlkPhos  70  09-07    PT/INR - ( 07 Sep 2021 11:08 )   PT: 11.8 sec;   INR: 0.98 ratio         PTT - ( 07 Sep 2021 11:08 )  PTT:31.1 sec          RADIOLOGY & ADDITIONAL TESTS:    Imaging Personally Reviewed:    Consultant(s) Notes Reviewed:      Care Discussed with Consultants/Other Providers:

## 2021-09-10 NOTE — DISCHARGE NOTE PROVIDER - CARE PROVIDER_API CALL
Manish Chan  CARDIOVASCULAR DISEASE  [address] [place] [address]  Phone: [phone]  Fax: [phone]  Established Patient  Follow Up Time: 1 week    JANE WHITING  Internal Medicine  [address] [place], [place] [id number]  Phone: [phone]  Fax: [phone]  Established Patient  Follow Up Time: 1 week

## 2021-09-10 NOTE — PROGRESS NOTE ADULT - ASSESSMENT
72 yo M with PMH of HTN, well controlled T2DM, sent to ED by outpatient cardiologist for abnormal CCTA. Had LHC on 9/9 via RRA, with severe 95% mid-LAD lesion s/p SERA x 1 and non-obstructive CAD in other vascular territories.     -cont DAPT with ASA and Brilinta  -, on high intensity statin atorva 40, with DM, goal LDL<70   -TTE with normal EF and mild AR   -f/u with outpatient cardiology closely  -RRA site stable, no vascular complications. Radial access site precautions. Do not use affected arm to lift, push, or pull more than 2-3 pounds for the next 7 days. Do not participate in strenuous activities for 4 days after your procedure. This includes most sports including jogging, golfing, playing tennis, and bowling.    No barrier for discharge from interventional cardiology standpoint. Please ensure patient has appropriate means of insurance coverage for all cardiovascular medications.    Thank you for allowing us to participate in the care of your patient. If you have any questions or concerns please do not hesitate to contact us 24/7.   All Cardiology service information can be found 24/7 on [email or web address], password: [email or web address] Etienne MD  PGY-5 Cardiology Fellow, [facility]-NS/COURTNEY

## 2021-09-10 NOTE — PROGRESS NOTE ADULT - PROBLEM SELECTOR PLAN 1
s/P PCI  Add BB  DAPT  DC planning
LHC planned for today
New T wave inversion in V3. Cardiac cath consult to be recalled based on CT angio findings of LAD stenosis

## 2021-09-21 PROBLEM — Z00.00 ENCOUNTER FOR PREVENTIVE HEALTH EXAMINATION: Status: ACTIVE | Noted: 2021-09-21

## 2023-06-22 NOTE — CONSULT NOTE ADULT - CONSULT REQUESTED BY NAME
Day 1 of Anesthesia Pain Management Service    SUBJECTIVE: Doing ok    Pain Scale Score:	[X] Refer to charted pain scores    THERAPY:  [X] IV PCA Hydromorphone	         [ ] 5 mg/mL	[X] 1 mg/mL  Demand dose: 0.2 mg     Lockout: 6 minutes   Continuous Rate: 0 mg/hr  4 Hour Limit: 4 mg    MEDICATIONS  (STANDING):  acetaminophen     Tablet  975 milliGRAM(s) Oral <User Schedule>  diphtheria/tetanus/pertussis (acellular) Vaccine (Adacel) 0.5 milliLiter(s) IntraMuscular once  heparin   Injectable 5000 Unit(s) SubCutaneous two times a day  HYDROmorphone PCA (1 mG/mL) 30 milliLiter(s) PCA Continuous PCA Continuous  ibuprofen  Tablet. 600 milliGRAM(s) Oral every 6 hours  lactated ringers. 1000 milliLiter(s) (125 mL/Hr) IV Continuous <Continuous>  magnesium sulfate Infusion 1 Gm/Hr (25 mL/Hr) IV Continuous <Continuous>  NIFEdipine XL 30 milliGRAM(s) Oral daily  oxytocin Infusion 333.333 milliUNIT(s)/Min (1000 mL/Hr) IV Continuous <Continuous>  sodium chloride 0.9%. 1000 milliLiter(s) (125 mL/Hr) IV Continuous <Continuous>    MEDICATIONS  (PRN):  diphenhydrAMINE 25 milliGRAM(s) Oral every 6 hours PRN Pruritus  HYDROmorphone   Tablet 2 milliGRAM(s) Oral every 6 hours PRN Severe Pain (7 - 10)  HYDROmorphone PCA (1 mG/mL) Rescue Clinician Bolus 0.5 milliGRAM(s) IV Push every 15 minutes PRN for Pain Scale GREATER THAN 6  lanolin Ointment 1 Application(s) Topical every 6 hours PRN Sore Nipples  magnesium hydroxide Suspension 30 milliLiter(s) Oral two times a day PRN Constipation  nalbuphine Injectable 2.5 milliGRAM(s) IV Push every 6 hours PRN Pruritus  naloxone Injectable 0.1 milliGRAM(s) IV Push every 3 minutes PRN For ANY of the following changes in patient status:  A. RR LESS THAN 10 breaths per minute, B. Oxygen saturation LESS THAN 90%, C. Sedation score of 6  ondansetron Injectable 4 milliGRAM(s) IV Push every 6 hours PRN Nausea  oxyCODONE    IR 5 milliGRAM(s) Oral every 3 hours PRN Moderate to Severe Pain (4-10)  oxyCODONE    IR 5 milliGRAM(s) Oral once PRN Moderate to Severe Pain (4-10)  simethicone 80 milliGRAM(s) Chew every 4 hours PRN Gas      OBJECTIVE:    Sedation Score:	[ X] Alert	 [ ] Drowsy 	[ ] Arousable	[ ] Asleep	[ ] Unresponsive    Side Effects:	[X ] None	[ ] Nausea	[ ] Vomiting	[ ] Pruritus  		[ ] Other:    Vital Signs Last 24 Hrs  T(C): 36.7 (22 Jun 2023 08:23), Max: 37 (22 Jun 2023 00:57)  T(F): 98.1 (22 Jun 2023 08:23), Max: 98.6 (22 Jun 2023 00:57)  HR: 81 (22 Jun 2023 08:23) (77 - 94)  BP: 147/88 (22 Jun 2023 08:23) (120/76 - 153/87)  BP(mean): --  RR: 18 (22 Jun 2023 08:23) (15 - 18)  SpO2: 94% (22 Jun 2023 08:23) (86% - 94%)    Parameters below as of 22 Jun 2023 08:23  Patient On (Oxygen Delivery Method): nasal cannula  O2 Flow (L/min): 4      ASSESSMENT/ PLAN    Therapy to  be:               [  ] Continued   [X ] Discontinued   [ X] Changed to PRN Analgesics    Documentation and Verification of current medications:   [X] Done	[ ] Not done, not eligible    Comments: Endorsing some incisional pain. PCA use minimal 0.2mg/24 hours.   Will d\c PCA and transition to prn analgesics  Wilfrido Hicks

## 2025-06-15 ENCOUNTER — INPATIENT (INPATIENT)
Facility: HOSPITAL | Age: 75
LOS: 2 days | Discharge: HOME HEALTH | DRG: 287 | End: 2025-06-18
Attending: GENERAL PRACTICE | Admitting: GENERAL PRACTICE
Payer: COMMERCIAL

## 2025-06-15 VITALS
HEART RATE: 58 BPM | RESPIRATION RATE: 19 BRPM | HEIGHT: 68 IN | DIASTOLIC BLOOD PRESSURE: 91 MMHG | SYSTOLIC BLOOD PRESSURE: 160 MMHG | OXYGEN SATURATION: 99 % | WEIGHT: 139.99 LBS | TEMPERATURE: 98 F

## 2025-06-15 DIAGNOSIS — R07.9 CHEST PAIN, UNSPECIFIED: ICD-10-CM

## 2025-06-15 DIAGNOSIS — I10 ESSENTIAL (PRIMARY) HYPERTENSION: ICD-10-CM

## 2025-06-15 DIAGNOSIS — I25.10 ATHEROSCLEROTIC HEART DISEASE OF NATIVE CORONARY ARTERY WITHOUT ANGINA PECTORIS: ICD-10-CM

## 2025-06-15 DIAGNOSIS — E78.5 HYPERLIPIDEMIA, UNSPECIFIED: ICD-10-CM

## 2025-06-15 DIAGNOSIS — M79.602 PAIN IN LEFT ARM: ICD-10-CM

## 2025-06-15 PROBLEM — E11.9 TYPE 2 DIABETES MELLITUS WITHOUT COMPLICATIONS: Chronic | Status: ACTIVE | Noted: 2021-09-07

## 2025-06-15 LAB
ALBUMIN SERPL ELPH-MCNC: 4.4 G/DL — SIGNIFICANT CHANGE UP (ref 3.3–5)
ALP SERPL-CCNC: 85 U/L — SIGNIFICANT CHANGE UP (ref 40–120)
ALT FLD-CCNC: 22 U/L — SIGNIFICANT CHANGE UP (ref 10–45)
ANION GAP SERPL CALC-SCNC: 14 MMOL/L — SIGNIFICANT CHANGE UP (ref 5–17)
APTT BLD: 31.8 SEC — SIGNIFICANT CHANGE UP (ref 26.1–36.8)
AST SERPL-CCNC: 25 U/L — SIGNIFICANT CHANGE UP (ref 10–40)
BASOPHILS # BLD AUTO: 0.06 K/UL — SIGNIFICANT CHANGE UP (ref 0–0.2)
BASOPHILS NFR BLD AUTO: 1.3 % — SIGNIFICANT CHANGE UP (ref 0–2)
BILIRUB SERPL-MCNC: 0.5 MG/DL — SIGNIFICANT CHANGE UP (ref 0.2–1.2)
BUN SERPL-MCNC: 6 MG/DL — LOW (ref 7–23)
CALCIUM SERPL-MCNC: 9.5 MG/DL — SIGNIFICANT CHANGE UP (ref 8.4–10.5)
CHLORIDE SERPL-SCNC: 105 MMOL/L — SIGNIFICANT CHANGE UP (ref 96–108)
CO2 SERPL-SCNC: 23 MMOL/L — SIGNIFICANT CHANGE UP (ref 22–31)
CREAT SERPL-MCNC: 0.87 MG/DL — SIGNIFICANT CHANGE UP (ref 0.5–1.3)
EGFR: 89 ML/MIN/1.73M2 — SIGNIFICANT CHANGE UP
EGFR: 89 ML/MIN/1.73M2 — SIGNIFICANT CHANGE UP
EOSINOPHIL # BLD AUTO: 0.62 K/UL — HIGH (ref 0–0.5)
EOSINOPHIL NFR BLD AUTO: 13.3 % — HIGH (ref 0–6)
GLUCOSE SERPL-MCNC: 89 MG/DL — SIGNIFICANT CHANGE UP (ref 70–99)
HCT VFR BLD CALC: 42.7 % — SIGNIFICANT CHANGE UP (ref 39–50)
HGB BLD-MCNC: 14.6 G/DL — SIGNIFICANT CHANGE UP (ref 13–17)
IMM GRANULOCYTES NFR BLD AUTO: 0.4 % — SIGNIFICANT CHANGE UP (ref 0–0.9)
INR BLD: 0.96 RATIO — SIGNIFICANT CHANGE UP (ref 0.85–1.16)
LIDOCAIN IGE QN: 71 U/L — HIGH (ref 7–60)
LYMPHOCYTES # BLD AUTO: 2.3 K/UL — SIGNIFICANT CHANGE UP (ref 1–3.3)
LYMPHOCYTES # BLD AUTO: 49.3 % — HIGH (ref 13–44)
MCHC RBC-ENTMCNC: 31.6 PG — SIGNIFICANT CHANGE UP (ref 27–34)
MCHC RBC-ENTMCNC: 34.2 G/DL — SIGNIFICANT CHANGE UP (ref 32–36)
MCV RBC AUTO: 92.4 FL — SIGNIFICANT CHANGE UP (ref 80–100)
MONOCYTES # BLD AUTO: 0.41 K/UL — SIGNIFICANT CHANGE UP (ref 0–0.9)
MONOCYTES NFR BLD AUTO: 8.8 % — SIGNIFICANT CHANGE UP (ref 2–14)
NEUTROPHILS # BLD AUTO: 1.26 K/UL — LOW (ref 1.8–7.4)
NEUTROPHILS NFR BLD AUTO: 26.9 % — LOW (ref 43–77)
NRBC BLD AUTO-RTO: 0 /100 WBCS — SIGNIFICANT CHANGE UP (ref 0–0)
NT-PROBNP SERPL-SCNC: 97 PG/ML — SIGNIFICANT CHANGE UP (ref 0–300)
PLATELET # BLD AUTO: 210 K/UL — SIGNIFICANT CHANGE UP (ref 150–400)
POTASSIUM SERPL-MCNC: 3.7 MMOL/L — SIGNIFICANT CHANGE UP (ref 3.5–5.3)
POTASSIUM SERPL-SCNC: 3.7 MMOL/L — SIGNIFICANT CHANGE UP (ref 3.5–5.3)
PROT SERPL-MCNC: 7.6 G/DL — SIGNIFICANT CHANGE UP (ref 6–8.3)
PROTHROM AB SERPL-ACNC: 11 SEC — SIGNIFICANT CHANGE UP (ref 9.9–13.4)
RBC # BLD: 4.62 M/UL — SIGNIFICANT CHANGE UP (ref 4.2–5.8)
RBC # FLD: 12.4 % — SIGNIFICANT CHANGE UP (ref 10.3–14.5)
SODIUM SERPL-SCNC: 142 MMOL/L — SIGNIFICANT CHANGE UP (ref 135–145)
TROPONIN T, HIGH SENSITIVITY RESULT: 7 NG/L — SIGNIFICANT CHANGE UP (ref 0–51)
TROPONIN T, HIGH SENSITIVITY RESULT: 8 NG/L — SIGNIFICANT CHANGE UP (ref 0–51)
WBC # BLD: 4.67 K/UL — SIGNIFICANT CHANGE UP (ref 3.8–10.5)
WBC # FLD AUTO: 4.67 K/UL — SIGNIFICANT CHANGE UP (ref 3.8–10.5)

## 2025-06-15 PROCEDURE — 73060 X-RAY EXAM OF HUMERUS: CPT | Mod: 26,LT

## 2025-06-15 PROCEDURE — 71046 X-RAY EXAM CHEST 2 VIEWS: CPT | Mod: 26

## 2025-06-15 PROCEDURE — 99285 EMERGENCY DEPT VISIT HI MDM: CPT | Mod: 25

## 2025-06-15 PROCEDURE — 73030 X-RAY EXAM OF SHOULDER: CPT | Mod: 26,LT

## 2025-06-15 RX ORDER — LATANOPROST PF 0.05 MG/ML
1 SOLUTION/ DROPS OPHTHALMIC
Refills: 0 | DISCHARGE

## 2025-06-15 RX ORDER — ATORVASTATIN CALCIUM 80 MG/1
40 TABLET, FILM COATED ORAL AT BEDTIME
Refills: 0 | Status: DISCONTINUED | OUTPATIENT
Start: 2025-06-15 | End: 2025-06-18

## 2025-06-15 RX ORDER — AMLODIPINE BESYLATE 10 MG/1
1 TABLET ORAL
Refills: 0 | DISCHARGE

## 2025-06-15 RX ORDER — AMLODIPINE BESYLATE 10 MG/1
10 TABLET ORAL DAILY
Refills: 0 | Status: DISCONTINUED | OUTPATIENT
Start: 2025-06-15 | End: 2025-06-18

## 2025-06-15 RX ORDER — ACETAMINOPHEN 500 MG/5ML
650 LIQUID (ML) ORAL EVERY 6 HOURS
Refills: 0 | Status: DISCONTINUED | OUTPATIENT
Start: 2025-06-15 | End: 2025-06-18

## 2025-06-15 RX ORDER — ONDANSETRON HCL/PF 4 MG/2 ML
4 VIAL (ML) INJECTION EVERY 8 HOURS
Refills: 0 | Status: DISCONTINUED | OUTPATIENT
Start: 2025-06-15 | End: 2025-06-18

## 2025-06-15 RX ORDER — MELATONIN 5 MG
3 TABLET ORAL AT BEDTIME
Refills: 0 | Status: DISCONTINUED | OUTPATIENT
Start: 2025-06-15 | End: 2025-06-18

## 2025-06-15 RX ORDER — MAGNESIUM, ALUMINUM HYDROXIDE 200-200 MG
30 TABLET,CHEWABLE ORAL EVERY 4 HOURS
Refills: 0 | Status: DISCONTINUED | OUTPATIENT
Start: 2025-06-15 | End: 2025-06-18

## 2025-06-15 RX ORDER — LATANOPROST PF 0.05 MG/ML
1 SOLUTION/ DROPS OPHTHALMIC AT BEDTIME
Refills: 0 | Status: DISCONTINUED | OUTPATIENT
Start: 2025-06-15 | End: 2025-06-18

## 2025-06-15 RX ORDER — OMEPRAZOLE 20 MG/1
1 CAPSULE, DELAYED RELEASE ORAL
Refills: 0 | DISCHARGE

## 2025-06-15 RX ORDER — ASPIRIN 325 MG
81 TABLET ORAL DAILY
Refills: 0 | Status: DISCONTINUED | OUTPATIENT
Start: 2025-06-15 | End: 2025-06-18

## 2025-06-15 RX ORDER — KETOROLAC TROMETHAMINE 30 MG/ML
15 INJECTION, SOLUTION INTRAMUSCULAR; INTRAVENOUS ONCE
Refills: 0 | Status: DISCONTINUED | OUTPATIENT
Start: 2025-06-15 | End: 2025-06-15

## 2025-06-15 RX ORDER — ACETAMINOPHEN 500 MG/5ML
1000 LIQUID (ML) ORAL ONCE
Refills: 0 | Status: COMPLETED | OUTPATIENT
Start: 2025-06-15 | End: 2025-06-15

## 2025-06-15 RX ADMIN — ATORVASTATIN CALCIUM 40 MILLIGRAM(S): 80 TABLET, FILM COATED ORAL at 21:13

## 2025-06-15 RX ADMIN — Medication 1000 MILLIGRAM(S): at 08:44

## 2025-06-15 RX ADMIN — Medication 400 MILLIGRAM(S): at 07:55

## 2025-06-15 RX ADMIN — AMLODIPINE BESYLATE 10 MILLIGRAM(S): 10 TABLET ORAL at 21:13

## 2025-06-15 RX ADMIN — KETOROLAC TROMETHAMINE 15 MILLIGRAM(S): 30 INJECTION, SOLUTION INTRAMUSCULAR; INTRAVENOUS at 11:02

## 2025-06-15 RX ADMIN — LATANOPROST PF 1 DROP(S): 0.05 SOLUTION/ DROPS OPHTHALMIC at 21:13

## 2025-06-15 RX ADMIN — KETOROLAC TROMETHAMINE 15 MILLIGRAM(S): 30 INJECTION, SOLUTION INTRAMUSCULAR; INTRAVENOUS at 11:30

## 2025-06-15 RX ADMIN — Medication 1000 MILLIGRAM(S): at 08:10

## 2025-06-15 NOTE — PATIENT PROFILE ADULT - FALL HARM RISK - RISK INTERVENTIONS
Assistance OOB with selected safe patient handling equipment/Assistance with ambulation/Communicate Fall Risk and Risk Factors to all staff, patient, and family/Discuss with provider need for PT consult/Monitor gait and stability/Provide patient with walking aids - walker, cane, crutches/Reinforce activity limits and safety measures with patient and family/Use of alarms - bed, chair and/or voice tab/Visual Cue: Yellow wristband/Bed in lowest position, wheels locked, appropriate side rails in place/Call bell, personal items and telephone in reach/Instruct patient to call for assistance before getting out of bed or chair/Non-slip footwear when patient is out of bed/[place] to call system/Physically safe environment - no spills, clutter or unnecessary equipment/Purposeful Proactive Rounding/Room/bathroom lighting operational, light cord in reach

## 2025-06-15 NOTE — H&P ADULT - PROBLEM SELECTOR PLAN 1
~~ High Sensitivity Troponin  ~~  7  <<--, 8  <<--    Pro-BNP: 97 (06-15-25 @ 08:03)    Echo  cardio     ? stress vs Cath  mo vitals  add ASA daily  Continue Current medications otherwise and monitor.

## 2025-06-15 NOTE — ED ADULT NURSE NOTE - NSFALLUNIVINTERV_ED_ALL_ED
Bed/Stretcher in lowest position, wheels locked, appropriate side rails in place/Call bell, personal items and telephone in reach/Instruct patient to call for assistance before getting out of bed/chair/stretcher/Non-slip footwear applied when patient is off stretcher/[place] to call system/Physically safe environment - no spills, clutter or unnecessary equipment/Purposeful proactive rounding/Room/bathroom lighting operational, light cord in reach

## 2025-06-15 NOTE — ED PROVIDER NOTE - MUSCULOSKELETAL, MLM
Spine appears normal, range of motion is not limited. Spine appears normal, range of motion is not limited. No chest wall ttp. No LUE ttp, able to range LUE without increased pain.

## 2025-06-15 NOTE — H&P ADULT - ASSESSMENT
Patient is a 74yo Male with past medical history of HTN, HLD, CAD s/p stent (2022), recent H. pylori infection,  presenting with epigastric and reported CP x 2 days.   Pt reported epigastric pain that began yesterday that radiated to his middle and L chest as well as L shoulder/upper arm.   Pt in general poor historian and with difficulty describing symptoms but reported "not feeling right." ? cough with white sputum. Pt reported he had an episode of similar symptoms 1 week ago that had resolved.   Of note, reported to ED that he went to OSH last week for b/l LE edema over the past 2-3 weeks and was advised to f/u with his doctor. No history of heart failure, does not take any diuretics.   Daughter was at bedside and reported to ED that pt follows with all of his doctors regularly, does not know for sure if patient had recent cardiac testing.    Notes currently only has shoulder/arm pain, no longer with chest pain  patient admitted by ED for cardiac workup

## 2025-06-15 NOTE — ED PROVIDER NOTE - CLINICAL SUMMARY MEDICAL DECISION MAKING FREE TEXT BOX
75yoM here with chest pain radiating to L arm/humerus area. Onset yesterday. Currently only complaining of L arm pain. No dizziness or shortness of breath. Similar episode 1 week ago that resolved on its own. Well appearing, no reproducible pain, lungs clear, exam otherwise nonfocal. R/o ACS. Labs, Xr for eval

## 2025-06-15 NOTE — ED PROVIDER NOTE - PROGRESS NOTE DETAILS
Pt with return of pain intermittently while in ED, labs and imaging unremarkable, plan to admit for cardiac workup. - Rhona Daniel PA-C Pt with return of pain intermittently while in ED, labs and imaging unremarkable, plan to admit for cardiac workup. - Rhona Daniel PA-C.

## 2025-06-15 NOTE — PATIENT PROFILE ADULT - FUNCTIONAL ASSESSMENT - BASIC MOBILITY 6.
4 = No assist / stand by assistance  3-calculated by average/Not able to assess (calculate score using [facility] averaging method)

## 2025-06-15 NOTE — ED ADULT NURSE NOTE - NS TRANSFER PATIENT BELONGINGS
[Dear  ___] : Dear  [unfilled], [Consult Letter:] : I had the pleasure of evaluating your patient, [unfilled]. [Please see my note below.] : Please see my note below. [Consult Closing:] : Thank you very much for allowing me to participate in the care of this patient.  If you have any questions, please do not hesitate to contact me. [Sincerely,] : Sincerely, [FreeTextEntry3] : Dr. Ginger Lizarraga  [employer]

## 2025-06-15 NOTE — ED PROVIDER NOTE - CARDIAC PEDAL EDEMA
Other (Free Text): Liquid nitrogen performed on two lesions today.  Risk of scarring and incomplete removal explained to patient.
Note Text (......Xxx Chief Complaint.): This diagnosis correlates with the
Detail Level: Zone
b/l/PITTING 2 MM

## 2025-06-15 NOTE — CONSULT NOTE ADULT - ASSESSMENT
Patient is a 76yo Male with past medical history of HTN, HLD, CAD s/p stent (2022), recent H. pylori infection,  presenting with epigastric and reported CP x 2 days.   Pt reported epigastric pain that began yesterday that radiated to his middle and L chest as well as L shoulder/upper arm.   Pt in general poor historian and with difficulty describing symptoms but reported "not feeling right." ? cough with white sputum. Pt reported he had an episode of similar symptoms 1 week ago that had resolved.   Of note, reported to ED that he went to OSH last week for b/l LE edema over the past 2-3 weeks and was advised to f/u with his doctor. No history of heart failure, does not take any diuretics.   Daughter was at bedside and reported to ED that pt follows with all of his doctors regularly, does not know for sure if patient had recent cardiac testing.    Notes currently only has shoulder/arm pain, no longer with chest pain.  pt with hx of htn, ashd, s/p SERA of the LAD with chest pain/ abdominal pain  gi eval  ct scan chest abdomen and pelvis  protonix  continue all cardiac meds  tte to assess the regional wall motion abnormality

## 2025-06-15 NOTE — ED ADULT NURSE NOTE - ED STAT RN HANDOFF DETAILS
Report given to receiving CHARLENE Lozano Report given to [facility] holding CHARLENE Brown

## 2025-06-15 NOTE — ED PROVIDER NOTE - OBJECTIVE STATEMENT
76yo M with PMH of HTN, HLD, CAD s/p stent, recent H. pylori infection,  presenting with epigastric pain and CP x 2 days. Pt reports epigastric pain that began yesterday that radiates to his middle and L chest as well as L shoulder/upper arm. Pt has difficulty describing symptoms but reports "not feeling right." + cough with white sputum. Pt reports he had an episode of similar symptoms 1 week ago that had resolved. Of note, reports he went to OSH last week for b/l LE edema over the past 2-3 weeks and was advised to f/u with his doctor. No history of heart failure, does not take any diuretics. Daughter at bedside reports pt follows with all of his doctors regularly, does not know for sure if patient had recent cardiac testing.  Denies fever/chills, SOB, n/v/d, urinary symptoms. Has not taken anything for pain today. 74yo M with PMH of HTN, HLD, CAD s/p stent, recent H. pylori infection,  presenting with epigastric pain and CP x 2 days. Pt reports epigastric pain that began yesterday that radiates to his middle and L chest as well as L shoulder/upper arm. Pt has difficulty describing symptoms but reports "not feeling right." + cough with white sputum. Pt reports he had an episode of similar symptoms 1 week ago that had resolved. Of note, reports he went to OSH last week for b/l LE edema over the past 2-3 weeks and was advised to f/u with his doctor. No history of heart failure, does not take any diuretics. Daughter at bedside reports pt follows with all of his doctors regularly, does not know for sure if patient had recent cardiac testing.  Denies fever/chills, SOB, n/v/d, urinary symptoms. Has not taken anything for pain today. Notes currently only has shoulder/arm pain, no longer with chest pain

## 2025-06-15 NOTE — H&P ADULT - HISTORY OF PRESENT ILLNESS
>>>>>>Medical Attending Initial / Admission note<<<<<<  -------------------------------------------------------------------------------  CHIEF COMPLAINT & HISTORY OF PRESENT ILLNESS:      Patient is a 74yo Male with past medical history of HTN, HLD, CAD s/p stent (2022), recent H. pylori infection,  presenting with epigastric and reported CP x 2 days.   Pt reported epigastric pain that began yesterday that radiated to his middle and L chest as well as L shoulder/upper arm.   Pt in general poor historian and with difficulty describing symptoms but reported "not feeling right." ? cough with white sputum. Pt reported he had an episode of similar symptoms 1 week ago that had resolved.   Of note, reported to ED that he went to OSH last week for b/l LE edema over the past 2-3 weeks and was advised to f/u with his doctor. No history of heart failure, does not take any diuretics.   Daughter was at bedside and reported to ED that pt follows with all of his doctors regularly, does not know for sure if patient had recent cardiac testing.    Notes currently only has shoulder/arm pain, no longer with chest pain  patient admitted by ED for cardiac workup     --------------------------------------------------------------------------------  PAST MEDICAL / SURGICAL  HISTORY:    HTN (hypertension)  ? history of DM (pt did not report) ( prior A1C 5.6))  HLD  CAD post stent 2022  glaucoma    denies surgical history     --------------------------------------------------------------------------------  FAMILY HISTORY:    denies     --------------------------------------------------------------------------------  SOCIAL HISTORY:  Alcohol: None reported  Smoking: remote smoking history     --------------------------------------------------------------------------------  ALLERGIES:    [As bellow, reviewed]    --------------------------------------------------------------------------------  MEDICATIONS:   [As bellow, reviewed]    --------------------------------------------------------------------------------  REVIEW OF SYSTEM:    GEN: no fever, no chills, as above   RESP: no SOB, no cough, no sputum  CVS: no chest pain, no palpitations, no edema  GI: no abdominal pain, no nausea, no vomiting, no constipation, no diarrhea  : no dysuria, no frequency, no hematuria  Neuro: no headache, no dizziness  PSYCH: no anxiety, no depression  Derm : no itching, no rash    --------------------------------------------------------------------------------  VITAL SIGNS:  Height (cm): 172.7  Weight (kg): 63.5  BMI (kg/m2): 21.3  Vital Signs Last 24 HrsT(C): 36.7 (06-15-25 @ 15:00)  T(F): 98 (06-15-25 @ 15:00), Max: 98.2 (06-15-25 @ 12:05)  HR: 60 (06-15-25 @ 15:00) (55 - 63)  BP: 147/76 (06-15-25 @ 15:00)  RR: 17 (06-15-25 @ 15:00) (17 - 19)  SpO2: 99% (06-15-25 @ 15:00) (97% - 99%)      --------------------------------------------------------------------------------  PHYSICAL EXAM:    GEN: A&O X 3 , NAD , comfortable, pleasant, calm, somewhat forgetful.. ambulating in the ED hallways   HEENT: NCAT, PERRL, MMM, hearing intact  Neck: supple , no JVD  CVS: S1S2 , regular , No M/R/G appreciated.. harika.. no edema noted   PULM: CTA B/L,  no W/R/R appreciated  ABD.: soft. non tender, non distended,  bowel sounds present  Extrem: intact pulses , no edema // some tenderness over left upper arm muscles ( denies injury)   Derm: No rash , no ecchymoses  PSYCH : normal mood,  no delusion not anxious    could not  locate EKG !    --------------------------------------------------------------------------------  LAB AND IMAGING:   [As jose carlos, reviewed]    --------------------------------------------------------------------------------  ASSESSMENT AND PLAN:   [As jose carlos]    --------------------  Case discussed with patient ..   ___________________________  H. ARLENE Joseph.  Pager: [phone]  06-15-25     ( Note written / Date of service 06-15-25 ( This is certified to be the same as "ENTERED" date above ( for billing Purposes )))

## 2025-06-15 NOTE — CONSULT NOTE ADULT - SUBJECTIVE AND OBJECTIVE BOX
Date of Service, 06-15-25 @ 18:11  CHIEF COMPLAINT:Patient is a 75y old  Male who presents with a chief complaint of left arm pain, chest  discomfort (15 Michael 2025 16:34)      HPI:  Patient is a 76yo Male with past medical history of HTN, HLD, CAD s/p stent (2022), recent H. pylori infection,  presenting with epigastric and reported CP x 2 days.   Pt reported epigastric pain that began yesterday that radiated to his middle and L chest as well as L shoulder/upper arm.   Pt in general poor historian and with difficulty describing symptoms but reported "not feeling right." ? cough with white sputum. Pt reported he had an episode of similar symptoms 1 week ago that had resolved.   Of note, reported to ED that he went to OSH last week for b/l LE edema over the past 2-3 weeks and was advised to f/u with his doctor. No history of heart failure, does not take any diuretics.   Daughter was at bedside and reported to ED that pt follows with all of his doctors regularly, does not know for sure if patient had recent cardiac testing.    Notes currently only has shoulder/arm pain, no longer with chest pain.      PAST MEDICAL & SURGICAL HISTORY:  HTN (hypertension)  DM (diabetes mellitus)    No significant past surgical history      MEDICATIONS  (STANDING):  amLODIPine   Tablet 10 milliGRAM(s) Oral daily  aspirin enteric coated 81 milliGRAM(s) Oral daily  atorvastatin 40 milliGRAM(s) Oral at bedtime  latanoprost 0.005% Ophthalmic Solution 1 Drop(s) Both EYES at bedtime  pantoprazole    Tablet 40 milliGRAM(s) Oral before breakfast    MEDICATIONS  (PRN):  acetaminophen     Tablet .. 650 milliGRAM(s) Oral every 6 hours PRN Temp greater or equal to 38C (100.4F), Mild Pain (1 - 3)  aluminum hydroxide/magnesium hydroxide/simethicone Suspension 30 milliLiter(s) Oral every 4 hours PRN Dyspepsia  melatonin 3 milliGRAM(s) Oral at bedtime PRN Insomnia  ondansetron Injectable 4 milliGRAM(s) IV Push every 8 hours PRN Nausea and/or Vomiting      FAMILY HISTORY:      SOCIAL HISTORY:    [ ] Non-smoker  [ ] Smoker  [ ] Alcohol    Allergies    No Known Allergies    Intolerances    	    REVIEW OF SYSTEMS:  CONSTITUTIONAL: No fever, weight loss, or fatigue  EYES: No eye pain, visual disturbances, or discharge  ENT:  No difficulty hearing, tinnitus, vertigo; No sinus or throat pain  NECK: No pain or stiffness  RESPIRATORY: No cough, wheezing, chills or hemoptysis; No Shortness of Breath  CARDIOVASCULAR: + chest pain, palpitations, passing out, dizziness, or leg swelling  GASTROINTESTINAL:+ abdominal or epigastric pain. No nausea, vomiting, or hematemesis; No diarrhea or constipation. No melena or hematochezia.  GENITOURINARY: No dysuria, frequency, hematuria, or incontinence  NEUROLOGICAL: No headaches, memory loss, loss of strength, numbness, or tremors  SKIN: No itching, burning, rashes, or lesions   LYMPH Nodes: No enlarged glands  ENDOCRINE: No heat or cold intolerance; No hair loss  MUSCULOSKELETAL: No joint pain or swelling; No muscle, back, or extremity pain  PSYCHIATRIC: No depression, anxiety, mood swings, or difficulty sleeping  HEME/LYMPH: No easy bruising, or bleeding gums  ALLERGY AND IMMUNOLOGIC: No hives or eczema	    x[ ] All others negative	  [ ] Unable to obtain    PHYSICAL EXAM:  T(C): 36.7 (06-15-25 @ 15:00), Max: 36.8 (06-15-25 @ 12:05)  HR: 60 (06-15-25 @ 15:00) (55 - 63)  BP: 147/76 (06-15-25 @ 15:00) (134/69 - 160/91)  RR: 17 (06-15-25 @ 15:00) (17 - 19)  SpO2: 99% (06-15-25 @ 15:00) (97% - 99%)  Wt(kg): --  I&O's Summary      Appearance: Normal	  HEENT:   Normal oral mucosa, PERRL, EOMI	  Lymphatic: No lymphadenopathy  Cardiovascular: Normal S1 S2, No JVD, + murmurs, No edema  Respiratory: Lungs clear to auscultation	  Psychiatry: A & O x 3, Mood & affect appropriate  Gastrointestinal:  Soft, + mildly tender, + BS	  Skin: No rashes, No ecchymoses, No cyanosis	  Neurologic: Non-focal  Extremities: Normal range of motion, No clubbing, cyanosis or edema  Vascular: Peripheral pulses palpable 2+ bilaterally    TELEMETRY: 	    ECG:  	  RADIOLOGY:  OTHER: 	  	  LABS:	 	    CARDIAC MARKERS:                              14.6   4.67  )-----------( 210      ( 15 Michael 2025 08:03 )             42.7     06-15    142  |  105  |  6[L]  ----------------------------<  89  3.7   |  23  |  0.87    Ca    9.5      15 Michael 2025 08:03    TPro  7.6  /  Alb  4.4  /  TBili  0.5  /  DBili  x   /  AST  25  /  ALT  22  /  AlkPhos  85  06-15    proBNP:   Lipid Profile:   HgA1c:   TSH:   PT/INR - ( 15 Michael 2025 08:03 )   PT: 11.0 sec;   INR: 0.96 ratio         PTT - ( 15 Michael 2025 08:03 )  PTT:31.8 sec    PREVIOUS DIAGNOSTIC TESTING:    < from: 12 Lead ECG (06.15.25 @ 06:45) >  Diagnosis Line SINUS RHYTHM WITH PREMATURE ATRIAL COMPLEXES  OTHERWISE NORMAL ECG  WHEN COMPARED WITH ECG OF 09-SEP-2021 18:34,  no  SIGNIFICANT CHANGES HAVE OCCURRED  < from: Transthoracic Echocardiogram (09.08.21 @ 15:44) >  1. Normal mitral valve. No mitral regurgitation seen.  2. Calcified trileaflet aortic valve with normal opening.  Mild aortic regurgitation.  3. Aortic Root: 3.6 cm.  4. Normal left ventricular systolic function. No segmental  wall motion abnormalities.  5. Normal right ventricular size and function.        < from: CT Angio Heart and Coronaries w/ IV Cont (09.08.21 @ 15:31) >  1. Agatston Calcium Score: 572 ; GOODMAN percentile: 85th    2. Coronary CTA: Right dominant coronary circulation. Obstructive disease is identified as follows.    -Severe stenosis of the mid LAD approximately 1cm in length which begins at the origin of the second diagonal branch. Additional probable moderate stenosis of the second diagonal origin.    -Near 50% stenosis of the left main coronary artery secondary to mixed plaque.    -Multifocal right coronary artery proximal/mid plaque which results in near 50% stenosis. Evaluation is limited due to significant vessel tortuosity.    -Additional nonobstructive disease as above.    Findings were discussed with JORGE Pinon on 9/8/2021 at 5:15 PM.    3. 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    < from: Cardiac Catheterization (09.09.21 @ 17:41) >  LM   Left main artery: Angiography shows no disease.      LAD   Mid left anterior descending: There is a 90 % De Natan stenosis in the  middle third portion of the segment after branch to D1.  JOSELYN Flow 3. This is an ACC/AHA Non-High/Non C lesion for  intervention.    CX   Circumflex: Angiography shows minor irregularities.      RCA   Right coronary artery: Angiography shows minor irregularities.      Interventional Findings:     Interventional Details   Mid left anterior descending: This was a 90 % De Natan stenosis. The  lesion length was 12 mm. This was an ACC/AHA  Non-High/Non C lesion for intervention. This lesion is not a chronic  total occlusion. This lesion was not previously dilated.  Guidewire crossing was successful.      A successful Drug Eluting Stent was deployed using a 6FR JL3.5  LAUNCHER, a [employer] UNIVERSAL 190CM, and a 3.50 X 15  MELONY.      The inflation pressure was 22 gonsalo for the duration of 12.0 seconds.

## 2025-06-16 ENCOUNTER — TRANSCRIPTION ENCOUNTER (OUTPATIENT)
Age: 75
End: 2025-06-16

## 2025-06-16 LAB
A1C WITH ESTIMATED AVERAGE GLUCOSE RESULT: 5.8 % — HIGH (ref 4–5.6)
ALBUMIN SERPL ELPH-MCNC: 3.9 G/DL — SIGNIFICANT CHANGE UP (ref 3.3–5)
ALP SERPL-CCNC: 78 U/L — SIGNIFICANT CHANGE UP (ref 40–120)
ALT FLD-CCNC: 20 U/L — SIGNIFICANT CHANGE UP (ref 10–45)
ANION GAP SERPL CALC-SCNC: 12 MMOL/L — SIGNIFICANT CHANGE UP (ref 5–17)
AST SERPL-CCNC: 25 U/L — SIGNIFICANT CHANGE UP (ref 10–40)
BASOPHILS # BLD AUTO: 0.05 K/UL — SIGNIFICANT CHANGE UP (ref 0–0.2)
BASOPHILS NFR BLD AUTO: 1 % — SIGNIFICANT CHANGE UP (ref 0–2)
BILIRUB SERPL-MCNC: 0.6 MG/DL — SIGNIFICANT CHANGE UP (ref 0.2–1.2)
BUN SERPL-MCNC: 8 MG/DL — SIGNIFICANT CHANGE UP (ref 7–23)
CALCIUM SERPL-MCNC: 9.4 MG/DL — SIGNIFICANT CHANGE UP (ref 8.4–10.5)
CHLORIDE SERPL-SCNC: 105 MMOL/L — SIGNIFICANT CHANGE UP (ref 96–108)
CHOLEST SERPL-MCNC: 161 MG/DL — SIGNIFICANT CHANGE UP
CK SERPL-CCNC: 372 U/L — HIGH (ref 30–200)
CO2 SERPL-SCNC: 23 MMOL/L — SIGNIFICANT CHANGE UP (ref 22–31)
CREAT SERPL-MCNC: 0.86 MG/DL — SIGNIFICANT CHANGE UP (ref 0.5–1.3)
EGFR: 89 ML/MIN/1.73M2 — SIGNIFICANT CHANGE UP
EGFR: 89 ML/MIN/1.73M2 — SIGNIFICANT CHANGE UP
EOSINOPHIL # BLD AUTO: 0.57 K/UL — HIGH (ref 0–0.5)
EOSINOPHIL NFR BLD AUTO: 11.6 % — HIGH (ref 0–6)
ESTIMATED AVERAGE GLUCOSE: 120 MG/DL — HIGH (ref 68–114)
GLUCOSE SERPL-MCNC: 80 MG/DL — SIGNIFICANT CHANGE UP (ref 70–99)
HCT VFR BLD CALC: 41.7 % — SIGNIFICANT CHANGE UP (ref 39–50)
HDLC SERPL-MCNC: 54 MG/DL — SIGNIFICANT CHANGE UP
HGB BLD-MCNC: 14.5 G/DL — SIGNIFICANT CHANGE UP (ref 13–17)
IMM GRANULOCYTES NFR BLD AUTO: 0.4 % — SIGNIFICANT CHANGE UP (ref 0–0.9)
LDLC SERPL-MCNC: 97 MG/DL — SIGNIFICANT CHANGE UP
LIPID PNL WITH DIRECT LDL SERPL: 97 MG/DL — SIGNIFICANT CHANGE UP
LYMPHOCYTES # BLD AUTO: 2.25 K/UL — SIGNIFICANT CHANGE UP (ref 1–3.3)
LYMPHOCYTES # BLD AUTO: 45.7 % — HIGH (ref 13–44)
MCHC RBC-ENTMCNC: 31.7 PG — SIGNIFICANT CHANGE UP (ref 27–34)
MCHC RBC-ENTMCNC: 34.8 G/DL — SIGNIFICANT CHANGE UP (ref 32–36)
MCV RBC AUTO: 91.2 FL — SIGNIFICANT CHANGE UP (ref 80–100)
MONOCYTES # BLD AUTO: 0.38 K/UL — SIGNIFICANT CHANGE UP (ref 0–0.9)
MONOCYTES NFR BLD AUTO: 7.7 % — SIGNIFICANT CHANGE UP (ref 2–14)
NEUTROPHILS # BLD AUTO: 1.65 K/UL — LOW (ref 1.8–7.4)
NEUTROPHILS NFR BLD AUTO: 33.6 % — LOW (ref 43–77)
NONHDLC SERPL-MCNC: 108 MG/DL — SIGNIFICANT CHANGE UP
NRBC BLD AUTO-RTO: 0 /100 WBCS — SIGNIFICANT CHANGE UP (ref 0–0)
PLATELET # BLD AUTO: 214 K/UL — SIGNIFICANT CHANGE UP (ref 150–400)
POTASSIUM SERPL-MCNC: 4.1 MMOL/L — SIGNIFICANT CHANGE UP (ref 3.5–5.3)
POTASSIUM SERPL-SCNC: 4.1 MMOL/L — SIGNIFICANT CHANGE UP (ref 3.5–5.3)
PROT SERPL-MCNC: 6.9 G/DL — SIGNIFICANT CHANGE UP (ref 6–8.3)
RBC # BLD: 4.57 M/UL — SIGNIFICANT CHANGE UP (ref 4.2–5.8)
RBC # FLD: 12.5 % — SIGNIFICANT CHANGE UP (ref 10.3–14.5)
SODIUM SERPL-SCNC: 140 MMOL/L — SIGNIFICANT CHANGE UP (ref 135–145)
TRIGL SERPL-MCNC: 56 MG/DL — SIGNIFICANT CHANGE UP
TROPONIN T, HIGH SENSITIVITY RESULT: 8 NG/L — SIGNIFICANT CHANGE UP (ref 0–51)
WBC # BLD: 4.92 K/UL — SIGNIFICANT CHANGE UP (ref 3.8–10.5)
WBC # FLD AUTO: 4.92 K/UL — SIGNIFICANT CHANGE UP (ref 3.8–10.5)

## 2025-06-16 RX ORDER — HEPARIN SODIUM 1000 [USP'U]/ML
5000 INJECTION INTRAVENOUS; SUBCUTANEOUS EVERY 12 HOURS
Refills: 0 | Status: DISCONTINUED | OUTPATIENT
Start: 2025-06-16 | End: 2025-06-18

## 2025-06-16 RX ADMIN — AMLODIPINE BESYLATE 10 MILLIGRAM(S): 10 TABLET ORAL at 06:05

## 2025-06-16 RX ADMIN — LATANOPROST PF 1 DROP(S): 0.05 SOLUTION/ DROPS OPHTHALMIC at 21:48

## 2025-06-16 RX ADMIN — ATORVASTATIN CALCIUM 40 MILLIGRAM(S): 80 TABLET, FILM COATED ORAL at 21:48

## 2025-06-16 RX ADMIN — HEPARIN SODIUM 5000 UNIT(S): 1000 INJECTION INTRAVENOUS; SUBCUTANEOUS at 18:06

## 2025-06-16 RX ADMIN — Medication 81 MILLIGRAM(S): at 11:05

## 2025-06-16 RX ADMIN — Medication 40 MILLIGRAM(S): at 06:05

## 2025-06-16 NOTE — PROGRESS NOTE ADULT - ASSESSMENT
_________________________________________________________________________________________  ========>>  M E D I C A L   A T T E N D I N G    F O L L O W  U P  N O T E  <<=========  -----------------------------------------------------------------------------------------------------    - Patient seen and examined by me earlier today.   - In summary,  VIVIAN MCKINNEY is a 75y year old man admitted with pain in arm and chest.   - Patient today overall doing ok, comfortable, eating OK.     no chest pain today.. continues to report some achy pain in left upper arm     ==================>> REVIEW OF SYSTEM <<=================    GEN: no fever, no chills, as above   RESP: no SOB, no cough, no sputum  CVS: no chest pain, no palpitations  GI: no abdominal pain, no nausea  : no dysuria, no frequency  Neuro: no headache, no dizziness    reports some memory problems,, referred to neuro as outpatient by PMD, pending evaluation    ==================>> PHYSICAL EXAM <<=================    GEN: A&O X 3 , NAD , comfortable, pleasant, calm   HEENT: NCAT, PERRL, MMM, hearing intact  CVS: S1S2 , regular , No M/R/G appreciated  PULM: CTA B/L,  no W/R/R appreciated  ABD.: soft. non tender, non distended,  bowel sounds present  Extrem: intact pulses , no edema           ( Note written / Date of service 06-16-25 ( This is certified to be the same as "ENTERED" date above ( for billing purposes)))    ==================>> MEDICATIONS <<====================    MEDICATIONS  (STANDING):  amLODIPine   Tablet 10 milliGRAM(s) Oral daily  aspirin enteric coated 81 milliGRAM(s) Oral daily  atorvastatin 40 milliGRAM(s) Oral at bedtime  heparin   Injectable 5000 Unit(s) SubCutaneous every 12 hours  latanoprost 0.005% Ophthalmic Solution 1 Drop(s) Both EYES at bedtime  pantoprazole    Tablet 40 milliGRAM(s) Oral before breakfast    MEDICATIONS  (PRN):  acetaminophen     Tablet .. 650 milliGRAM(s) Oral every 6 hours PRN Temp greater or equal to 38C (100.4F), Mild Pain (1 - 3)  aluminum hydroxide/magnesium hydroxide/simethicone Suspension 30 milliLiter(s) Oral every 4 hours PRN Dyspepsia  melatonin 3 milliGRAM(s) Oral at bedtime PRN Insomnia  ondansetron Injectable 4 milliGRAM(s) IV Push every 8 hours PRN Nausea and/or Vomiting    ___________  Active diet:  Diet, Regular:   DASH/TLC Sodium & Cholesterol Restricted (DASH)  ___________________    ==================>> VITAL SIGNS <<==================    T(C): 36.6 (06-16-25 @ 10:40), Max: 36.6 (06-15-25 @ 19:26)  HR: 75 (06-16-25 @ 13:50) (62 - 75)  BP: 111/58 (06-16-25 @ 10:40) (111/58 - 149/71)  BP(mean): --  RR: 19 (06-16-25 @ 13:50) (18 - 19)  SpO2: 97% (06-16-25 @ 13:50) (95% - 97%)     CAPILLARY BLOOD GLUCOSE        I&O's Summary    15 Michael 2025 07:01  -  16 Jun 2025 07:00  --------------------------------------------------------  IN: 0 mL / OUT: 300 mL / NET: -300 mL       ==================>> LAB AND IMAGING <<==================                        14.5   4.92  )-----------( 214      ( 16 Jun 2025 06:52 )             41.7        06-16    140  |  105  |  8   ----------------------------<  80  4.1   |  23  |  0.86    Ca    9.4      16 Jun 2025 06:52    TPro  6.9  /  Alb  3.9  /  TBili  0.6  /  DBili  x   /  AST  25  /  ALT  20  /  AlkPhos  78  06-16    PT/INR - ( 15 Michael 2025 08:03 )   PT: 11.0 sec;   INR: 0.96 ratio         PTT - ( 15 Michael 2025 08:03 )  PTT:31.8 sec              Lipid profile:  (06-16-25)     Total: 161     LDL  : (p)     HDL  :54     TG   :56     PENDING ECHO    ___________________________________________________________________________________  ===============>>  A S S E S S M E N T   A N D   P L A N <<===============  ------------------------------------------------------------------------------------------    Patient is a 74yo Male with past medical history of HTN, HLD, CAD s/p stent (2022), recent H. pylori infection,  presenting with epigastric and reported CP x 2 days.   Pt reported epigastric pain that began yesterday that radiated to his middle and L chest as well as L shoulder/upper arm.   Pt in general poor historian and with difficulty describing symptoms but reported "not feeling right." ? cough with white sputum. Pt reported he had an episode of similar symptoms 1 week ago that had resolved.   Of note, reported to ED that he went to OSH last week for b/l LE edema over the past 2-3 weeks and was advised to f/u with his doctor. No history of heart failure, does not take any diuretics.   Daughter was at bedside and reported to ED that pt follows with all of his doctors regularly, does not know for sure if patient had recent cardiac testing.    Notes currently only has shoulder/arm pain, no longer with chest pain  patient admitted by ED for cardiac workup          Problem/Plan - 1:  ·  Problem: Chest pain.   ruled out for ACS by enzymes  Echo pending   cardio appreciated      ? stress vs Cath  monitor vitals  ASA daily  Continue Current medications otherwise and monitor.     Problem/Plan - 2:  ·  Problem: Arm pain, left.   ·  Plan: appears MSK related..   ruling out cardiac causes    pain management  supportive care.     Problem/Plan - 3:  ·  Problem: CAD (coronary artery disease).   ·  Plan: Continue home medications and monitor.  ? not on asa at home > added.     Problem/Plan - 4:  ·  Problem: HTN (hypertension).   ·  Plan: Continue home medications and monitor.  echo  cardio to follow.     Problem/Plan - 5:  ·  Problem: HLD (hyperlipidemia).   ·  Plan: Continue home medications and monitor.  lipid profile.        -GI/DVT Prophylaxis per protocol.    Estimate Discharge :   --------------------------------------------  Case discussed with   Education given on findings and plan of care  ___________________________  H. ARLENE Joseph.  Pager: [phone]     _________________________________________________________________________________________  ========>>  M E D I C A L   A T T E N D I N G    F O L L O W  U P  N O T E  <<=========  -----------------------------------------------------------------------------------------------------    - Patient seen and examined by me earlier today.   - In summary,  VIVIAN MCKINNEY is a 75y year old man admitted with pain in arm and chest.   - Patient today overall doing ok, comfortable, eating OK.     no chest pain today.. continues to report some achy pain in left upper arm     ==================>> REVIEW OF SYSTEM <<=================    GEN: no fever, no chills, as above   RESP: no SOB, no cough, no sputum  CVS: no chest pain, no palpitations  GI: no abdominal pain, no nausea  : no dysuria, no frequency  Neuro: no headache, no dizziness    reports some memory problems,, referred to neuro as outpatient by PMD, pending evaluation    ==================>> PHYSICAL EXAM <<=================    GEN: A&O X 3 , NAD , comfortable, pleasant, calm   HEENT: NCAT, PERRL, MMM, hearing intact  CVS: S1S2 , regular , No M/R/G appreciated  PULM: CTA B/L,  no W/R/R appreciated  ABD.: soft. non tender, non distended,  bowel sounds present  Extrem: intact pulses , no edema           ( Note written / Date of service 06-16-25 ( This is certified to be the same as "ENTERED" date above ( for billing purposes)))    ==================>> MEDICATIONS <<====================    MEDICATIONS  (STANDING):  amLODIPine   Tablet 10 milliGRAM(s) Oral daily  aspirin enteric coated 81 milliGRAM(s) Oral daily  atorvastatin 40 milliGRAM(s) Oral at bedtime  heparin   Injectable 5000 Unit(s) SubCutaneous every 12 hours  latanoprost 0.005% Ophthalmic Solution 1 Drop(s) Both EYES at bedtime  pantoprazole    Tablet 40 milliGRAM(s) Oral before breakfast    MEDICATIONS  (PRN):  acetaminophen     Tablet .. 650 milliGRAM(s) Oral every 6 hours PRN Temp greater or equal to 38C (100.4F), Mild Pain (1 - 3)  aluminum hydroxide/magnesium hydroxide/simethicone Suspension 30 milliLiter(s) Oral every 4 hours PRN Dyspepsia  melatonin 3 milliGRAM(s) Oral at bedtime PRN Insomnia  ondansetron Injectable 4 milliGRAM(s) IV Push every 8 hours PRN Nausea and/or Vomiting    ___________  Active diet:  Diet, Regular:   DASH/TLC Sodium & Cholesterol Restricted (DASH)  ___________________    ==================>> VITAL SIGNS <<==================    T(C): 36.6 (06-16-25 @ 10:40), Max: 36.6 (06-15-25 @ 19:26)  HR: 75 (06-16-25 @ 13:50) (62 - 75)  BP: 111/58 (06-16-25 @ 10:40) (111/58 - 149/71)  RR: 19 (06-16-25 @ 13:50) (18 - 19)  SpO2: 97% (06-16-25 @ 13:50) (95% - 97%)       I&O's Summary    15 Michael 2025 07:01  -  16 Jun 2025 07:00  --------------------------------------------------------  IN: 0 mL / OUT: 300 mL / NET: -300 mL       ==================>> LAB AND IMAGING <<==================                        14.5   4.92  )-----------( 214      ( 16 Jun 2025 06:52 )             41.7        06-16    140  |  105  |  8   ----------------------------<  80  4.1   |  23  |  0.86    Ca    9.4      16 Jun 2025 06:52    TPro  6.9  /  Alb  3.9  /  TBili  0.6  /  DBili  x   /  AST  25  /  ALT  20  /  AlkPhos  78  06-16    PT/INR - ( 15 Michael 2025 08:03 )   PT: 11.0 sec;   INR: 0.96 ratio    PTT - ( 15 Michael 2025 08:03 )  PTT:31.8 sec              Lipid profile:  (06-16-25)     Total: 161     LDL  : (p)     HDL  :54     TG   :56     PENDING ECHO    ___________________________________________________________________________________  ===============>>  A S S E S S M E N T   A N D   P L A N <<===============  ------------------------------------------------------------------------------------------    Patient is a 74yo Male with past medical history of HTN, HLD, CAD s/p stent (2022), recent H. pylori infection,  presenting with epigastric and reported CP x 2 days.   Pt reported epigastric pain that began yesterday that radiated to his middle and L chest as well as L shoulder/upper arm.   Pt in general poor historian and with difficulty describing symptoms but reported "not feeling right." ? cough with white sputum. Pt reported he had an episode of similar symptoms 1 week ago that had resolved.   Of note, reported to ED that he went to OSH last week for b/l LE edema over the past 2-3 weeks and was advised to f/u with his doctor. No history of heart failure, does not take any diuretics.   Daughter was at bedside and reported to ED that pt follows with all of his doctors regularly, does not know for sure if patient had recent cardiac testing.    Notes currently only has shoulder/arm pain, no longer with chest pain  patient admitted by ED for cardiac workup          Problem/Plan - 1:  ·  Problem: Chest pain.   ruled out for ACS by enzymes  Echo pending   cardio appreciated      ? stress vs Cath  monitor vitals  ASA daily  Continue Current medications otherwise and monitor.     Problem/Plan - 2:  ·  Problem: Arm pain, left.   ·  Plan: appears MSK related..   ruling out cardiac causes  imaging noted, no fractures..   pain management  increase activity as able   supportive care.     Problem/Plan - 3:  ·  Problem: CAD (coronary artery disease).   ·  Plan: Continue home medications and monitor.  ? not on asa at home > added.     Problem/Plan - 4:  ·  Problem: HTN (hypertension).   ·  Plan: Continue home medications and monitor.  echo  cardio to follow.     Problem/Plan - 5:  ·  Problem: HLD (hyperlipidemia).   ·  Plan: Continue home medications and monitor.  lipid profile.    Estimate Discharge : pending workup   --------------------------------------------  Case discussed with patient..   Education given on findings and plan of care  ___________________________  H. ARLENE Joseph.  Pager: [phone]

## 2025-06-17 ENCOUNTER — RESULT REVIEW (OUTPATIENT)
Age: 75
End: 2025-06-17

## 2025-06-17 PROCEDURE — 93306 TTE W/DOPPLER COMPLETE: CPT | Mod: 26

## 2025-06-17 PROCEDURE — 93454 CORONARY ARTERY ANGIO S&I: CPT | Mod: 26

## 2025-06-17 PROCEDURE — 93356 MYOCRD STRAIN IMG SPCKL TRCK: CPT

## 2025-06-17 PROCEDURE — 99152 MOD SED SAME PHYS/QHP 5/>YRS: CPT

## 2025-06-17 RX ADMIN — ATORVASTATIN CALCIUM 40 MILLIGRAM(S): 80 TABLET, FILM COATED ORAL at 21:04

## 2025-06-17 RX ADMIN — HEPARIN SODIUM 5000 UNIT(S): 1000 INJECTION INTRAVENOUS; SUBCUTANEOUS at 05:16

## 2025-06-17 RX ADMIN — Medication 81 MILLIGRAM(S): at 11:16

## 2025-06-17 RX ADMIN — AMLODIPINE BESYLATE 10 MILLIGRAM(S): 10 TABLET ORAL at 05:16

## 2025-06-17 RX ADMIN — Medication 40 MILLIGRAM(S): at 05:16

## 2025-06-17 RX ADMIN — Medication 75 MILLILITER(S): at 16:49

## 2025-06-17 RX ADMIN — LATANOPROST PF 1 DROP(S): 0.05 SOLUTION/ DROPS OPHTHALMIC at 21:25

## 2025-06-17 RX ADMIN — Medication 750 MILLILITER(S): at 16:49

## 2025-06-17 RX ADMIN — HEPARIN SODIUM 5000 UNIT(S): 1000 INJECTION INTRAVENOUS; SUBCUTANEOUS at 19:18

## 2025-06-17 NOTE — PRE-OP CHECKLIST - WEIGHT IN KG
63.5 Patient is a 89y old  Male who presents with a chief complaint of weight loss (18 Jun 2022 20:52)      INTERVAL HPI/OVERNIGHT EVENTS: Patient seen and examined at bedside. No overnight events. Denies chest pain, palpitations, dizziness, nausea/vomiting.     MEDICATIONS  (STANDING):  apixaban 2.5 milliGRAM(s) Oral every 12 hours  digoxin     Tablet 125 MICROGram(s) Oral every other day  diltiazem    Tablet 90 milliGRAM(s) Oral every 6 hours  furosemide    Tablet 20 milliGRAM(s) Oral daily  metoprolol tartrate 50 milliGRAM(s) Oral two times a day  midodrine. 5 milliGRAM(s) Oral every 8 hours    MEDICATIONS  (PRN):  acetaminophen     Tablet .. 650 milliGRAM(s) Oral every 6 hours PRN Temp greater or equal to 38C (100.4F), Mild Pain (1 - 3)  ALBUTerol    0.083% 2.5 milliGRAM(s) Nebulizer every 4 hours PRN Shortness of Breath and/or Wheezing  aluminum hydroxide/magnesium hydroxide/simethicone Suspension 30 milliLiter(s) Oral every 4 hours PRN Dyspepsia  guaifenesin/dextromethorphan Oral Liquid 10 milliLiter(s) Oral every 6 hours PRN Cough  melatonin 3 milliGRAM(s) Oral at bedtime PRN Insomnia  ondansetron Injectable 4 milliGRAM(s) IV Push every 8 hours PRN Nausea and/or Vomiting      Allergies    No Known Allergies    Intolerances      REVIEW OF SYSTEMS:  CONSTITUTIONAL: No fever or chills  HEENT:  No headache, no sore throat  RESPIRATORY: + cough,  No wheezing, + shortness of breath  CARDIOVASCULAR: No chest pain, palpitations  GASTROINTESTINAL: No abd pain, nausea, vomiting, or diarrhea  GENITOURINARY: No dysuria, frequency, or hematuria  NEUROLOGICAL: no focal weakness or dizziness  MUSCULOSKELETAL: no myalgias, + weakness     Vital Signs Last 24 Hrs  T(C): 36.4 (19 Jun 2022 04:38), Max: 36.4 (18 Jun 2022 12:25)  T(F): 97.5 (19 Jun 2022 04:38), Max: 97.5 (18 Jun 2022 12:25)  HR: 102 (19 Jun 2022 04:38) (90 - 114)  BP: 123/78 (19 Jun 2022 04:38) (99/66 - 123/78)  BP(mean): --  RR: 19 (19 Jun 2022 04:38) (19 - 20)  SpO2: 94% (19 Jun 2022 04:38) (94% - 95%)    PHYSICAL EXAM:  GENERAL: [place], Appears cachetic, weak and frail looking   HEENT:  anicteric, moist mucous membranes  CHEST/LUNG: crackles in lungs at bases b/l with increased respiratory effort  HEART: Tachycardic w/ irregularly irregular rates; No murmurs, rubs, or gallops  ABDOMEN:  BS+, soft, nontender, nondistended  EXTREMITIES: no edema, no  cyanosis, or calf tenderness  NERVOUS SYSTEM: answers questions and follows commands appropriately, confused at time     LABS:                        12.8   6.80  )-----------( 181      ( 19 Jun 2022 06:46 )             38.9     CBC Full  -  ( 19 Jun 2022 06:46 )  WBC Count : 6.80 K/uL  Hemoglobin : 12.8 g/dL  Hematocrit : 38.9 %  Platelet Count - Automated : 181 K/uL  Mean Cell Volume : 101.3 fl  Mean Cell Hemoglobin : 33.3 pg  Mean Cell Hemoglobin Concentration : 32.9 gm/dL  Auto Neutrophil # : x  Auto Lymphocyte # : x  Auto Monocyte # : x  Auto Eosinophil # : x  Auto Basophil # : x  Auto Neutrophil % : x  Auto Lymphocyte % : x  Auto Monocyte % : x  Auto Eosinophil % : x  Auto Basophil % : x    18 Jun 2022 07:29    143    |  106    |  37     ----------------------------<  108    3.9     |  32     |  1.00     Ca    8.7        18 Jun 2022 07:29  Phos  2.5       18 Jun 2022 07:29    TPro  6.5    /  Alb  2.5    /  TBili  1.8    /  DBili  x      /  AST  28     /  ALT  55     /  AlkPhos  108    18 Jun 2022 07:29    PT/INR - ( 17 Jun 2022 07:59 )   PT: 23.6 sec;   INR: 2.00 ratio         PTT - ( 17 Jun 2022 07:59 )  PTT:33.6 sec    CAPILLARY BLOOD GLUCOSE            Culture - Urine (collected 06-15-22 @ 22:25)  Source: Clean Catch Clean Catch (Midstream)  Final Report (06-16-22 @ 22:56):    <10,000 CFU/mL Normal Urogenital Renae    Culture - Blood (collected 06-15-22 @ 18:31)  Source: .Blood Blood-Peripheral  Preliminary Report (06-16-22 @ 19:02):    No growth to date.    Culture - Blood (collected 06-15-22 @ 18:31)  Source: .Blood Blood-Peripheral  Preliminary Report (06-16-22 @ 19:02):    No growth to date.        RADIOLOGY & ADDITIONAL TESTS:    Personally reviewed.     Consultant(s) Notes Reviewed:  [x] YES  [ ] NO

## 2025-06-17 NOTE — PHYSICAL THERAPY INITIAL EVALUATION ADULT - NSPTDMEREC_GEN_A_CORE
Pt will require a rolling walker due to dx of    weakness   to help complete MRADLS's./rolling walker

## 2025-06-17 NOTE — PHYSICAL THERAPY INITIAL EVALUATION ADULT - GAIT DEVIATIONS NOTED, PT EVAL
decreased nick/increased time in double stance/decreased velocity of limb motion/decreased step length/decreased stride length/decreased weight-shifting ability

## 2025-06-17 NOTE — PROGRESS NOTE ADULT - ASSESSMENT
_________________________________________________________________________________________  ========>>  M E D I C A L   A T T E N D I N G    F O L L O W  U P  N O T E  <<=========  -----------------------------------------------------------------------------------------------------    - Patient seen and examined by me earlier today.   - In summary,  VIVIAN MCKINNEY is a 75y year old man admitted with pain in arm and chest.   - Patient today overall doing ok, comfortable, eating OK.     no chest pain today.. continues to report some achy pain in left upper arm and shoulder >> patient educated on therapeutic exercises for shoulder arthritis      hot packs being applied     ==================>> REVIEW OF SYSTEM <<=================    GEN: no fever, no chills, as above   RESP: no SOB, no cough, no sputum  CVS: no chest pain, no palpitations  GI: no abdominal pain, no nausea  : no dysuria, no frequency  Neuro: no headache, no dizziness    reports some memory problems,, referred to neuro as outpatient by PMD, pending evaluation    ==================>> PHYSICAL EXAM <<=================    GEN: A&O X 3 , NAD , comfortable, pleasant, calm   HEENT: NCAT, PERRL, MMM, hearing intact  CVS: S1S2 , regular , No M/R/G appreciated  PULM: CTA B/L,  no W/R/R appreciated  ABD.: soft. non tender, non distended,  bowel sounds present  Extrem: intact pulses , no edema           ( Note written / Date of service 06-17-25 ( This is certified to be the same as "ENTERED" date above ( for billing purposes)))    ==================>> MEDICATIONS <<====================    amLODIPine   Tablet 10 milliGRAM(s) Oral daily  aspirin enteric coated 81 milliGRAM(s) Oral daily  atorvastatin 40 milliGRAM(s) Oral at bedtime  heparin   Injectable 5000 Unit(s) SubCutaneous every 12 hours  latanoprost 0.005% Ophthalmic Solution 1 Drop(s) Both EYES at bedtime  pantoprazole    Tablet 40 milliGRAM(s) Oral before breakfast    MEDICATIONS  (PRN):  acetaminophen     Tablet .. 650 milliGRAM(s) Oral every 6 hours PRN Temp greater or equal to 38C (100.4F), Mild Pain (1 - 3)  aluminum hydroxide/magnesium hydroxide/simethicone Suspension 30 milliLiter(s) Oral every 4 hours PRN Dyspepsia  melatonin 3 milliGRAM(s) Oral at bedtime PRN Insomnia  ondansetron Injectable 4 milliGRAM(s) IV Push every 8 hours PRN Nausea and/or Vomiting    ___________  Active diet:  Diet, Regular:   DASH/TLC Sodium & Cholesterol Restricted (DASH)  ___________________    ==================>> VITAL SIGNS <<==================   Height (cm): 172.7  Weight (kg): 63.5  BMI (kg/m2): 21.3  Vital Signs Last 24 HrsT(C): 36.7 (06-17-25 @ 04:26)  T(F): 98.1 (06-17-25 @ 04:26), Max: 98.4 (06-16-25 @ 21:25)  HR: 64 (06-17-25 @ 04:26) (62 - 75)  BP: 131/71 (06-17-25 @ 04:26)  RR: 18 (06-17-25 @ 04:26) (18 - 19)  SpO2: 97% (06-17-25 @ 04:26) (97% - 97%)       ==================>> LAB AND IMAGING <<==================                        14.5   4.92  )-----------( 214      ( 16 Jun 2025 06:52 )             41.7        06-16    140  |  105  |  8   ----------------------------<  80  4.1   |  23  |  0.86    Ca    9.4      16 Jun 2025 06:52    TPro  6.9  /  Alb  3.9  /  TBili  0.6  /  DBili  x   /  AST  25  /  ALT  20  /  AlkPhos  78  06-16      < from: TTE W or WO Ultrasound Enhancing Agent (06.17.25 @ 11:48) >  CONCLUSIONS:   1. Left ventricular cavity is small. Left ventricular wall thickness is normal. Left ventricular systolic function is normal with an ejection fraction of 67 % by Levy's method of disks. There are no regional wall motionabnormalities seen.   2. Normal right ventricular cavity size, with normal wall thickness, and normal right ventricular systolic function.   3. No pericardial effusion seen.   4. No prior echocardiogram is available for comparison.   5. Left ventricular global longitudinal strain is -22.1 % which is normal (< -18%). Images were acquired on a [employer] ultrasound system and processed on the ultrasound machine with a heart rate of 64 bpm and a blood pressure of 131/71 mmHg.   6. Mild to moderate aortic regurgitation.   7. The inferior vena cava is normal in size measuring 1.97 cm in diameter, (normal <2.1cm) with abnormal inspiratory collapse (abnormal <50%) consistent with mildly elevated right atrial pressure (~8, range 5-10mmHg).   8. There is normal LV mass and concentric remodeling.  < end of copied text >    ___________________________________________________________________________________  ===============>>  A S S E S S M E N T   A N D   P L A N <<===============  ------------------------------------------------------------------------------------------    Patient is a 76yo Male with past medical history of HTN, HLD, CAD s/p stent (2022), recent H. pylori infection,  presenting with epigastric and reported CP x 2 days.   Pt reported epigastric pain that began yesterday that radiated to his middle and L chest as well as L shoulder/upper arm.   Pt in general poor historian and with difficulty describing symptoms but reported "not feeling right." ? cough with white sputum. Pt reported he had an episode of similar symptoms 1 week ago that had resolved.   Of note, reported to ED that he went to OSH last week for b/l LE edema over the past 2-3 weeks and was advised to f/u with his doctor. No history of heart failure, does not take any diuretics.   Daughter was at bedside and reported to ED that pt follows with all of his doctors regularly, does not know for sure if patient had recent cardiac testing.    Notes currently only has shoulder/arm pain, no longer with chest pain  patient admitted by ED for cardiac workup          Problem/Plan - 1:  ·  Problem: Chest pain.   ruled out for ACS by enzymes  Echo as above : Aortic insufficiency, normal EF   cardio to follow re need for further testing..   monitor vitals  ASA daily  Continue Current medications otherwise and monitor.     Problem/Plan - 2:  ·  Problem: Arm pain, left.   ·  Plan: appears MSK related..   imaging noted, no fractures..   pain management  increase activity as able   supportive care.  PT  / exercise      Problem/Plan - 3:  ·  Problem: CAD (coronary artery disease).   ·  Plan: Continue home medications and monitor.  ? not on asa at home > added.     Problem/Plan - 4:  ·  Problem: HTN (hypertension).   ·  Plan: Continue home medications and monitor.  echo  cardio to follow.     Problem/Plan - 5:  ·  Problem: HLD (hyperlipidemia).   ·  Plan: Continue home medications and monitor.  lipid profile.    --------------------------------------------  Case discussed with patient.. RN..   Education given on findings and plan of care  ___________________________  H. ARLENE Joseph.  Pager: [phone]

## 2025-06-17 NOTE — PHYSICAL THERAPY INITIAL EVALUATION ADULT - PLANNED THERAPY INTERVENTIONS, PT EVAL
GOALS: Pt will negotiate 12 steps with handrail & Step to pattern with independence in 2wks/bed mobility training/gait training/transfer training

## 2025-06-17 NOTE — CHART NOTE - NSCHARTNOTEFT_GEN_A_CORE
HPI:  74yo M with PMH of HTN, HLD, CAD s/p stent, recent H. pylori infection,  presenting with epigastric pain and CP x 2 days. Pt reports epigastric pain that began yesterday that radiates to his middle and L chest as well as L shoulder/upper arm. Of note, reports he went to OSH last week for b/l LE edema over the past 2-3 weeks and was advised to f/u with his doctor. No history of heart failure, does not take any diuretics.     Patient now status post diagnostic LHC via RRA. Site clean, dry, intact. Pulses 2+ palpable proximal and distal to access site, capillary refill appropriate and is without hematoma. Neurovascularly intact. Warm to touch. No thrill or bruit present.  Will continue to  follow closely.    Vital Signs Last 24 Hrs  T(C): 36.8 (17 Jun 2025 17:35), Max: 36.9 (16 Jun 2025 21:25)  T(F): 98.3 (17 Jun 2025 17:35), Max: 98.4 (16 Jun 2025 21:25)  HR: 68 (17 Jun 2025 20:36) (55 - 71)  BP: 138/65 (17 Jun 2025 20:36) (115/65 - 149/79)  BP(mean): --  RR: 18 (17 Jun 2025 19:54) (16 - 18)  SpO2: 97% (17 Jun 2025 19:54) (95% - 98%)    Parameters below as of 17 Jun 2025 19:54  Patient On (Oxygen Delivery Method): room air        Rhona Dailey PA-C  Medicine ACP  #[id number]

## 2025-06-17 NOTE — PHYSICAL THERAPY INITIAL EVALUATION ADULT - GENERAL OBSERVATIONS, REHAB EVAL
received semisupine in bed, A&Ox3, forgetful, following commands, willing to participate, a/w chest discomfor

## 2025-06-17 NOTE — PHYSICAL THERAPY INITIAL EVALUATION ADULT - ADDITIONAL COMMENTS
Pt states that he resides in apt with spouse, 0 steps to enter, one level within, PTA independent with mobility and ADL's, states that he owns a cane.

## 2025-06-17 NOTE — PHYSICAL THERAPY INITIAL EVALUATION ADULT - PERTINENT HX OF CURRENT PROBLEM, REHAB EVAL
Pt is 75M admitted 6/15/25 PMHx HTN, HLD, CAD s/p stent (2022), recent H. pylori infection,  presenting with epigastric and reported CP x 2 days. Pt reported epigastric pain that began yesterday that radiated to his middle and L chest as well as L shoulder/upper arm.     Of note, reported to ED that he went to OSH last week for b/l LE edema over the past 2-3 weeks and was advised to f/u with his doctor. No history of heart failure, does not take any diuretics.   Daughter was at bedside and reported to ED that pt follows with all of his doctors regularly, does not know for sure if patient had recent cardiac testing.  Notes currently only has shoulder/arm pain, no longer with chest pain, patient admitted by ED for cardiac workup.     XRAY CHEST: clear lungs

## 2025-06-17 NOTE — PHYSICAL THERAPY INITIAL EVALUATION ADULT - PRECAUTIONS/LIMITATIONS, REHAB EVAL
Patient comes to clinic for follow up anticoagulation visit.  DX: Atrial Fibrillation  Goal range: 2.0-3.0    LAST(INR) 14.9 on 12/7/18. Dose Hold Warfarin 12/7, 12/8/12/9 &Vitamain K 5 mg given.   Todays INR is 1.6. Dose Decrease 11.4% 3.75 mg W: 7.5 mg all other days=48.75 mg/wk  as per protocol.  Follow up 1 wk after dose adjustment with close watch . See Ambulatory Anticoagulation Flow Sheet.  See abnormal findings.     Teaching: dose, return date, conditions requiring contact with Coumadin Clinic. Dosing calendar provided.  Pt verbalized understanding of instructions and is aware of need to call with any questions or concerns and to report any changes in medications, health, diet and / or lifestyle.     Dr. Reese is in office today supervising treatment. Note forwarded to physician for review.     
fall precautions

## 2025-06-18 ENCOUNTER — TRANSCRIPTION ENCOUNTER (OUTPATIENT)
Age: 75
End: 2025-06-18

## 2025-06-18 VITALS
RESPIRATION RATE: 19 BRPM | OXYGEN SATURATION: 98 % | HEART RATE: 78 BPM | TEMPERATURE: 98 F | SYSTOLIC BLOOD PRESSURE: 120 MMHG | DIASTOLIC BLOOD PRESSURE: 68 MMHG

## 2025-06-18 LAB
ANION GAP SERPL CALC-SCNC: 15 MMOL/L — SIGNIFICANT CHANGE UP (ref 5–17)
BUN SERPL-MCNC: 13 MG/DL — SIGNIFICANT CHANGE UP (ref 7–23)
CALCIUM SERPL-MCNC: 9.2 MG/DL — SIGNIFICANT CHANGE UP (ref 8.4–10.5)
CHLORIDE SERPL-SCNC: 105 MMOL/L — SIGNIFICANT CHANGE UP (ref 96–108)
CO2 SERPL-SCNC: 22 MMOL/L — SIGNIFICANT CHANGE UP (ref 22–31)
CREAT SERPL-MCNC: 0.92 MG/DL — SIGNIFICANT CHANGE UP (ref 0.5–1.3)
EGFR: 86 ML/MIN/1.73M2 — SIGNIFICANT CHANGE UP
EGFR: 86 ML/MIN/1.73M2 — SIGNIFICANT CHANGE UP
GLUCOSE SERPL-MCNC: 82 MG/DL — SIGNIFICANT CHANGE UP (ref 70–99)
POTASSIUM SERPL-MCNC: 3.8 MMOL/L — SIGNIFICANT CHANGE UP (ref 3.5–5.3)
POTASSIUM SERPL-SCNC: 3.8 MMOL/L — SIGNIFICANT CHANGE UP (ref 3.5–5.3)
SODIUM SERPL-SCNC: 142 MMOL/L — SIGNIFICANT CHANGE UP (ref 135–145)

## 2025-06-18 PROCEDURE — 85730 THROMBOPLASTIN TIME PARTIAL: CPT

## 2025-06-18 PROCEDURE — 36415 COLL VENOUS BLD VENIPUNCTURE: CPT

## 2025-06-18 PROCEDURE — 73060 X-RAY EXAM OF HUMERUS: CPT

## 2025-06-18 PROCEDURE — C1887: CPT

## 2025-06-18 PROCEDURE — 83880 ASSAY OF NATRIURETIC PEPTIDE: CPT

## 2025-06-18 PROCEDURE — 96375 TX/PRO/DX INJ NEW DRUG ADDON: CPT

## 2025-06-18 PROCEDURE — 71046 X-RAY EXAM CHEST 2 VIEWS: CPT

## 2025-06-18 PROCEDURE — 84484 ASSAY OF TROPONIN QUANT: CPT

## 2025-06-18 PROCEDURE — 97161 PT EVAL LOW COMPLEX 20 MIN: CPT

## 2025-06-18 PROCEDURE — 93306 TTE W/DOPPLER COMPLETE: CPT

## 2025-06-18 PROCEDURE — 85610 PROTHROMBIN TIME: CPT

## 2025-06-18 PROCEDURE — 96374 THER/PROPH/DIAG INJ IV PUSH: CPT

## 2025-06-18 PROCEDURE — 80061 LIPID PANEL: CPT

## 2025-06-18 PROCEDURE — 93356 MYOCRD STRAIN IMG SPCKL TRCK: CPT

## 2025-06-18 PROCEDURE — 93005 ELECTROCARDIOGRAM TRACING: CPT

## 2025-06-18 PROCEDURE — 80048 BASIC METABOLIC PNL TOTAL CA: CPT

## 2025-06-18 PROCEDURE — 73030 X-RAY EXAM OF SHOULDER: CPT

## 2025-06-18 PROCEDURE — 83036 HEMOGLOBIN GLYCOSYLATED A1C: CPT

## 2025-06-18 PROCEDURE — 85025 COMPLETE CBC W/AUTO DIFF WBC: CPT

## 2025-06-18 PROCEDURE — 93454 CORONARY ARTERY ANGIO S&I: CPT

## 2025-06-18 PROCEDURE — 82550 ASSAY OF CK (CPK): CPT

## 2025-06-18 PROCEDURE — 99285 EMERGENCY DEPT VISIT HI MDM: CPT

## 2025-06-18 PROCEDURE — C1769: CPT

## 2025-06-18 PROCEDURE — 83690 ASSAY OF LIPASE: CPT

## 2025-06-18 PROCEDURE — 80053 COMPREHEN METABOLIC PANEL: CPT

## 2025-06-18 PROCEDURE — C1894: CPT

## 2025-06-18 RX ORDER — SENNA 187 MG
2 TABLET ORAL
Qty: 0 | Refills: 0 | DISCHARGE
Start: 2025-06-18

## 2025-06-18 RX ORDER — ASPIRIN 325 MG
1 TABLET ORAL
Qty: 0 | Refills: 0 | DISCHARGE
Start: 2025-06-18

## 2025-06-18 RX ORDER — POLYETHYLENE GLYCOL 3350 17 G/17G
17 POWDER, FOR SOLUTION ORAL
Qty: 0 | Refills: 0 | DISCHARGE
Start: 2025-06-18

## 2025-06-18 RX ORDER — ATORVASTATIN CALCIUM 80 MG/1
1 TABLET, FILM COATED ORAL
Qty: 0 | Refills: 0 | DISCHARGE
Start: 2025-06-18

## 2025-06-18 RX ORDER — SENNA 187 MG
2 TABLET ORAL AT BEDTIME
Refills: 0 | Status: DISCONTINUED | OUTPATIENT
Start: 2025-06-18 | End: 2025-06-18

## 2025-06-18 RX ORDER — POLYETHYLENE GLYCOL 3350 17 G/17G
17 POWDER, FOR SOLUTION ORAL DAILY
Refills: 0 | Status: DISCONTINUED | OUTPATIENT
Start: 2025-06-18 | End: 2025-06-18

## 2025-06-18 RX ADMIN — POLYETHYLENE GLYCOL 3350 17 GRAM(S): 17 POWDER, FOR SOLUTION ORAL at 12:16

## 2025-06-18 RX ADMIN — HEPARIN SODIUM 5000 UNIT(S): 1000 INJECTION INTRAVENOUS; SUBCUTANEOUS at 05:10

## 2025-06-18 RX ADMIN — Medication 81 MILLIGRAM(S): at 12:16

## 2025-06-18 RX ADMIN — Medication 40 MILLIGRAM(S): at 05:10

## 2025-06-18 RX ADMIN — AMLODIPINE BESYLATE 10 MILLIGRAM(S): 10 TABLET ORAL at 05:10

## 2025-06-18 RX ADMIN — HEPARIN SODIUM 5000 UNIT(S): 1000 INJECTION INTRAVENOUS; SUBCUTANEOUS at 18:31

## 2025-06-18 NOTE — DISCHARGE NOTE PROVIDER - PROVIDER TOKENS
PROVIDER:[TOKEN:[[id number]],FOLLOWUP:[1 week]],PROVIDER:[TOKEN:[[id number]],FOLLOWUP:[2 weeks]]

## 2025-06-18 NOTE — DISCHARGE NOTE PROVIDER - CARE PROVIDER_API CALL
Emery Chandra  Internal Medicine  [address] [place], [place] [id number]  Phone: [phone]  Fax: [phone]  Follow Up Time: 1 week    Manish Chan  Cardiovascular Disease  [address] [place] [id number]  Phone: [phone]  Fax: [phone]  Follow Up Time: 2 weeks

## 2025-06-18 NOTE — CHART NOTE - NSCHARTNOTEFT_GEN_A_CORE
Rolling Walker:  Patient requires rolling walker due to CAD (ICD 10:  I25.1 ) for safe ambulation at discharge .  Pt  and family in agreement in using it    Angella Graham NP-C [id number] Rollator    Patient requires rollator  with seat and  brakes (  Rollator  )due to CAD (ICD 10:  I25.1 ) for safe ambulation at discharge .  Pt  and family in agreement in using it  .    Angella Graham NP-C [id number] Rollator    Patient requires rollator  with seat and  brakes (  Rollator  )due to CAD (ICD 10:  I25.1 ) for safe ambulation at discharge to complete  MRADLS     Pt  and family in agreement in using it  .    Angella Graham NP-C [id number]

## 2025-06-18 NOTE — DISCHARGE NOTE NURSING/CASE MANAGEMENT/SOCIAL WORK - NSDCPNINST_GEN_ALL_CORE
Call and make follow up appointment with MD after discharged. Return to the nearest ER or call 911 for worsening chest pian, difficulty breathing or palpitations. Check wrist area for swelling, hardness, bleeding, pain, or numbness in fingers, notify MD.

## 2025-06-18 NOTE — CHART NOTE - NSCHARTNOTEFT_GEN_A_CORE
Post cath site check #2    Patient status post diagnostic LHC via RRA. Site clean, dry, intact. Pulses 2+ palpable proximal and distal to access site, capillary refill appropriate and is without hematoma. Neurovascularly intact. Warm to touch. No thrill or bruit present.      Rhona Dailey PA-C  Medicine ACP  #[id number]

## 2025-06-18 NOTE — DISCHARGE NOTE NURSING/CASE MANAGEMENT/SOCIAL WORK - PATIENT PORTAL LINK FT
You can access the FollowMyHealth Patient Portal offered by [facility] by registering at the following website: [email or web address]. By joining [facility]’s FollowMyHealth portal, you will also be able to view your health information using other applications (apps) compatible with our system.

## 2025-06-18 NOTE — DISCHARGE NOTE PROVIDER - NSDCMRMEDTOKEN_GEN_ALL_CORE_FT
amLODIPine 10 mg oral tablet: 1 tab(s) orally once a day  atorvastatin 40 mg oral tablet: 1 tab(s) orally once a day (at bedtime)  latanoprost 0.005% ophthalmic solution: 1 drop(s) in each eye once a day (at bedtime)  omeprazole 20 mg oral delayed release capsule: 1 cap(s) orally once a day   amLODIPine 10 mg oral tablet: 1 tab(s) orally once a day  aspirin 81 mg oral delayed release tablet: 1 tab(s) orally once a day  atorvastatin 40 mg oral tablet: 1 tab(s) orally once a day (at bedtime)  latanoprost 0.005% ophthalmic solution: 1 drop(s) in each eye once a day (at bedtime)  omeprazole 20 mg oral delayed release capsule: 1 cap(s) orally once a day  polyethylene glycol 3350 oral powder for reconstitution: 17 gram(s) orally once a day  senna leaf extract oral tablet: 2 tab(s) orally once a day (at bedtime)

## 2025-06-18 NOTE — PROGRESS NOTE ADULT - ASSESSMENT
M E D I C A L   A T T E N D I N G    F O L L O W    U P   N O T E  (06-18-25 )                                     ------------------------------------------------------------------------------------------------    patient evaluated by me, case discussed with team, chart, medications, and physical exam reviewed, labs / tests  and vitals reviewed by me, as bellow.   Patient is stable for discharge today. patient medically stable and cleared by cardio.. going home   Patient to follow up with  PMD / cardio / Neuro   See discharge document for full note (discussed with ACP).  [Greater than 35 min spent for these services. ]          ( Note written / Date of service 06-18-25 ( This is certified to be the same as "ENTERED" date above ( for billing purposes)))    ==================>> MEDICATIONS <<====================    amLODIPine   Tablet 10 milliGRAM(s) Oral daily  aspirin enteric coated 81 milliGRAM(s) Oral daily  atorvastatin 40 milliGRAM(s) Oral at bedtime  heparin   Injectable 5000 Unit(s) SubCutaneous every 12 hours  latanoprost 0.005% Ophthalmic Solution 1 Drop(s) Both EYES at bedtime  pantoprazole    Tablet 40 milliGRAM(s) Oral before breakfast  polyethylene glycol 3350 17 Gram(s) Oral daily  senna 2 Tablet(s) Oral at bedtime  sodium chloride 0.9%. 1000 milliLiter(s) IV Continuous <Continuous>    MEDICATIONS  (PRN):  acetaminophen     Tablet .. 650 milliGRAM(s) Oral every 6 hours PRN Temp greater or equal to 38C (100.4F), Mild Pain (1 - 3)  aluminum hydroxide/magnesium hydroxide/simethicone Suspension 30 milliLiter(s) Oral every 4 hours PRN Dyspepsia  melatonin 3 milliGRAM(s) Oral at bedtime PRN Insomnia  ondansetron Injectable 4 milliGRAM(s) IV Push every 8 hours PRN Nausea and/or Vomiting    ___________  Active diet:  Diet, Regular:   DASH/TLC Sodium & Cholesterol Restricted (DASH)  ___________________    ==================>> VITAL SIGNS <<==================    T(C): 36.4 (06-18-25 @ 12:25), Max: 36.8 (06-17-25 @ 15:14)  HR: 67 (06-18-25 @ 12:25) (55 - 76)  BP: 121/62 (06-18-25 @ 12:25) (115/67 - 149/79)  RR: 18 (06-18-25 @ 12:25) (16 - 18)  SpO2: 97% (06-18-25 @ 12:25) (94% - 98%)      ==================>> LAB AND IMAGING <<==================       06-18    142  |  105  |  13  ----------------------------<  82  3.8   |  22  |  0.92    Ca    9.2      18 Jun 2025 06:44        Lipid profile:  (06-16-25)     Total: 161     LDL  : (p)     HDL  :54     TG   :56     HgA1C:   (06-16-25)          (06-16-25)      5.8    WBC count:   4.92 <<== ,  4.67 <<==   Hemoglobin:   14.5 <<==,  14.6 <<==  platelets:  214 <==, 210 <==    Creatinine:  0.92  <<==, 0.86  <<==, 0.87  <<==  Sodium:   142  <==, 140  <==, 142  <==       AST:          25(06-16) <== , 25(06-15) <==      ALT:        20(06-16)  <== , 22(06-15)  <==      AP:        78(06-16)  <=, 85(06-15)  <=     Bili:        0.6(06-16)  <=, 0.5(06-15)  <=     < from: Cardiac Catheterization (06.17.25 @ 17:15) >  Procedures:                 1.    Arterial Access - Right Radial   2.    Diagnostic Coronary Angiography     Indications:      CCS Class I     Diagnostic Conclusions:     Mild nonobstructive CAD.  Medical therapy       < end of copied text >    < from: TTE W or WO Ultrasound Enhancing Agent (06.17.25 @ 11:48) >  CONCLUSIONS:      1. Left ventricular cavity is small. Left ventricular wall thickness is normal. Left ventricular systolic function is normal with an ejection fraction of 67 % by Levy's method of disks. There are no regional wall motionabnormalities seen.   2. Normal right ventricular cavity size, with normal wall thickness, and normal right ventricular systolic function.   3. No pericardial effusion seen.   4. No prior echocardiogram is available for comparison.   5. Left ventricular global longitudinal strain is -22.1 % which is normal (< -18%). Images were acquired on a [employer] ultrasound system and processed on the ultrasound machine with a heart rate of 64 bpm and a blood pressure of 131/71 mmHg.   6. Mild to moderate aortic regurgitation.   7. The inferior vena cava is normal in size measuring 1.97 cm in diameter, (normal <2.1cm) with abnormal inspiratory collapse (abnormal <50%) consistent with mildly elevated right atrial pressure (~8, range 5-10mmHg).   8. There is normal LV mass and concentric remodeling.    < end of copied text >       ( Note written / Date of service 06-18-25 ( This is certified to be the same as "ENTERED" date above ( for billing Purposes )))

## 2025-06-18 NOTE — DISCHARGE NOTE PROVIDER - HOSPITAL COURSE
74yo M with PMH of HTN, HLD, CAD s/p stent, recent H. pylori infection,  presenting with epigastric pain and CP x 2 days. Pt reports epigastric pain that began yesterday that radiates to his middle and L chest as well as L shoulder/upper arm. Of note, reports he went to OSH last week for b/l LE edema over the past 2-3 weeks and was advised to f/u with his doctor. No history of heart failure, does not take any diuretics.     Hospital Course:  Patient admitted with  chest pain.  ACS was ruled out by negative cardiac enzymes. An echocardiogram showed aortic insufficiency and a normal ejection fraction (EF).  Cardiology was consulted and recommended cardiac catheterization. The patient subsequently underwent a diagnostic left heart catheterization (LHC) via right radial artery (RRA) access.  The patient also complained of left arm pain, which appeared musculoskeletal in nature. Imaging revealed no fractures. Pain management was provided, and the patient was encouraged to increase activity as tolerated, along with supportive care. The patient's known coronary artery disease (CAD) and hypertension (HTN) are being managed with continuation of home medications and monitoring.  Aspirin was started.  Physical therapy (PT) was consulted and recommended home PT.    Discharge/Dispo/Med rec discussed with attending  ____. Patient medically cleared for discharge ____ with outpatient follow up    Important Medication Changes and Reason:    Active or Pending Issues Requiring Follow-up:    Advanced Directives:   [ x] Full code  [ ] DNR  [ ] Hospice    Discharge Diagnoses:    Dx    Chest pain 74yo M with PMH of HTN, HLD, CAD s/p stent, recent H. pylori infection,  presenting with epigastric pain and CP x 2 days. Pt reports epigastric pain that began yesterday that radiates to his middle and L chest as well as L shoulder/upper arm. Of note, reports he went to OSH last week for b/l LE edema over the past 2-3 weeks and was advised to f/u with his doctor. No history of heart failure, does not take any diuretics.     Hospital Course:  Patient admitted with  chest pain.  ACS was ruled out by negative cardiac enzymes. An echocardiogram showed aortic insufficiency and a normal ejection fraction (EF).  Cardiology was consulted and recommended cardiac catheterization. The patient subsequently underwent a diagnostic left heart catheterization (LHC) via right radial artery (RRA) access.  The patient also complained of left arm pain, which appeared musculoskeletal in nature. Imaging revealed no fractures. Pain management was provided, and the patient was encouraged to increase activity as tolerated, along with supportive care. The patient's known coronary artery disease (CAD) and hypertension (HTN) are being managed with continuation of home medications and monitoring.  Aspirin was started.  Physical therapy (PT) was consulted and recommended home PT.    Discharge/Dispo/Med rec discussed with attending      Patient medically cleared for discharge Home with Home PT , with outpatient follow up    Important Medication Changes and Reason:  See discharge reconciliation      Active or Pending Issues Requiring Follow-up:  PCP in 1 week   cardiology in 2 weeks     Advanced Directives:   [ x] Full code  [ ] DNR  [ ] Hospice    Discharge Diagnoses:    Atypical chest pain    L arm pain   cad   htn     76yo M with PMH of HTN, HLD, CAD s/p stent, recent H. pylori infection,  presenting with epigastric pain and CP x 2 days. Pt reports epigastric pain that began yesterday that radiates to his middle and L chest as well as L shoulder/upper arm. Of note, reports he went to OSH last week for b/l LE edema over the past 2-3 weeks and was advised to f/u with his doctor. No history of heart failure, does not take any diuretics.     Hospital Course:  Patient admitted with  chest pain.  ACS was ruled out by negative cardiac enzymes.   An echocardiogram showed aortic insufficiency and a normal ejection fraction .    Cardiology was consulted and recommended cardiac catheterization. The patient subsequently underwent a diagnostic left heart catheterization  via right radial artery  access.  The patient also complained of left arm pain, which appeared musculoskeletal in nature. Imaging revealed no fractures. Pain management was provided, and the patient was encouraged to increase activity as tolerated, along with supportive care. The patient's known coronary artery disease and hypertension are being managed with continuation of home medications and monitoring.  Aspirin was started.  Physical therapy was consulted and recommended home PT.    Discharge/Dispo/Med rec discussed with attending      Patient medically cleared for discharge Home with Home PT , with outpatient follow up    Important Medication Changes and Reason:  See discharge reconciliation      Active or Pending Issues Requiring Follow-up:  PCP in 1 week   cardiology in 2 weeks     Advanced Directives:   [ x] Full code  [ ] DNR  [ ] Hospice    Discharge Diagnoses:    Atypical chest pain    L arm pain   cad   htn

## 2025-06-18 NOTE — DISCHARGE NOTE PROVIDER - NSDCCPCAREPLAN_GEN_ALL_CORE_FT
PRINCIPAL DISCHARGE DIAGNOSIS  Diagnosis: Chest pain  Assessment and Plan of Treatment: ACS was ruled out by negative cardiac enzymes. An echocardiogram showed aortic insufficiency and a normal ejection fraction (EF).  The patient subsequently underwent a diagnostic left heart catheterization (LHC) via right radial artery (RRA) access. A transthoracic echocardiogram (TTE) showed an LVEF of 67%, no wall motion abnormalities, and mild to moderate aortic regurgitation (AR).         SECONDARY DISCHARGE DIAGNOSES  Diagnosis: Arm pain, left  Assessment and Plan of Treatment: no fractures    Diagnosis: CAD (coronary artery disease)  Assessment and Plan of Treatment: continue asa    Diagnosis: HLD (hyperlipidemia)  Assessment and Plan of Treatment: continue home meds    Diagnosis: HTN (hypertension)  Assessment and Plan of Treatment: continue home meds     PRINCIPAL DISCHARGE DIAGNOSIS  Diagnosis: Chest pain  Assessment and Plan of Treatment: ACS was ruled out by negative cardiac enzymes. An echocardiogram showed aortic insufficiency and a normal ejection fraction (EF).  The patient subsequently underwent a diagnostic left heart catheterization (LHC) via right radial artery (RRA) access. A transthoracic echocardiogram (TTE) showed an LVEF of 67%, no wall motion abnormalities, and mild to moderate aortic regurgitation (AR).         SECONDARY DISCHARGE DIAGNOSES  Diagnosis: HTN (hypertension)  Assessment and Plan of Treatment: Follow up with your medical doctor to establish long term blood pressure treatment goals.      Diagnosis: Arm pain, left  Assessment and Plan of Treatment: Xray of shoulder and    and L arm neg for fractures  Warm compress    c/w Tylenol  for  pain and discomfort    Diagnosis: CAD (coronary artery disease)  Assessment and Plan of Treatment: continue asa  Follow up with cardiologist in 2 weeks    Diagnosis: HLD (hyperlipidemia)  Assessment and Plan of Treatment: Low fat diet   c/w  Atorvastatin  40 mg daily

## 2025-06-18 NOTE — PROGRESS NOTE ADULT - SUBJECTIVE AND OBJECTIVE BOX
Date of Service: 06-17-25 @ 07:35           CARDIOLOGY     PROGRESS  NOTE   ________________________________________________  CHIEF COMPLAINT:Patient is a 75y old  Male who presents with a chief complaint of left arm pain, chest  discomfort (16 Jun 2025 15:04)  no complain  	  REVIEW OF SYSTEMS:  CONSTITUTIONAL: No fever, weight loss, or fatigue  EYES: No eye pain, visual disturbances, or discharge  ENT:  No difficulty hearing, tinnitus, vertigo; No sinus or throat pain  NECK: No pain or stiffness  RESPIRATORY: No cough, wheezing, chills or hemoptysis; No Shortness of Breath  CARDIOVASCULAR: No chest pain, palpitations, passing out, dizziness, or leg swelling  GASTROINTESTINAL: No abdominal or epigastric pain. No nausea, vomiting, or hematemesis; No diarrhea or constipation. No melena or hematochezia.  GENITOURINARY: No dysuria, frequency, hematuria, or incontinence  NEUROLOGICAL: No headaches, memory loss, loss of strength, numbness, or tremors  SKIN: No itching, burning, rashes, or lesions   LYMPH Nodes: No enlarged glands  ENDOCRINE: No heat or cold intolerance; No hair loss  MUSCULOSKELETAL: No joint pain or swelling; No muscle, back, or extremity pain  PSYCHIATRIC: No depression, anxiety, mood swings, or difficulty sleeping  HEME/LYMPH: No easy bruising, or bleeding gums  ALLERGY AND IMMUNOLOGIC: No hives or eczema	    [x ] All others negative	  [ ] Unable to obtain    PHYSICAL EXAM:  T(C): 36.7 (06-17-25 @ 04:26), Max: 36.9 (06-16-25 @ 21:25)  HR: 64 (06-17-25 @ 04:26) (62 - 75)  BP: 131/71 (06-17-25 @ 04:26) (111/58 - 131/71)  RR: 18 (06-17-25 @ 04:26) (18 - 19)  SpO2: 97% (06-17-25 @ 04:26) (96% - 97%)  Wt(kg): --  I&O's Summary      Appearance: Normal	  HEENT:   Normal oral mucosa, PERRL, EOMI	  Lymphatic: No lymphadenopathy  Cardiovascular: Normal S1 S2, No JVD, + murmurs, No edema  Respiratory: rhonchi  Psychiatry: A & O x 3, Mood & affect appropriate  Gastrointestinal:  Soft, Non-tender, + BS	  Skin: No rashes, No ecchymoses, No cyanosis	  Neurologic: Non-focal  Extremities: Normal range of motion, No clubbing, cyanosis or edema  Vascular: Peripheral pulses palpable 2+ bilaterally    MEDICATIONS  (STANDING):  amLODIPine   Tablet 10 milliGRAM(s) Oral daily  aspirin enteric coated 81 milliGRAM(s) Oral daily  atorvastatin 40 milliGRAM(s) Oral at bedtime  heparin   Injectable 5000 Unit(s) SubCutaneous every 12 hours  latanoprost 0.005% Ophthalmic Solution 1 Drop(s) Both EYES at bedtime  pantoprazole    Tablet 40 milliGRAM(s) Oral before breakfast      TELEMETRY: 	    ECG:  	  RADIOLOGY:  OTHER: 	  	  LABS:	 	    CARDIAC MARKERS:                            14.5   4.92  )-----------( 214      ( 16 Jun 2025 06:52 )             41.7     06-16    140  |  105  |  8   ----------------------------<  80  4.1   |  23  |  0.86    Ca    9.4      16 Jun 2025 06:52    TPro  6.9  /  Alb  3.9  /  TBili  0.6  /  DBili  x   /  AST  25  /  ALT  20  /  AlkPhos  78  06-16    proBNP:   Lipid Profile: Cholesterol 161  LDL --  HDL 54  TG 56    HgA1c:   TSH:   PT/INR - ( 15 Michael 2025 08:03 )   PT: 11.0 sec;   INR: 0.96 ratio         PTT - ( 15 Michael 2025 08:03 )  PTT:31.8 sec      < from: Transthoracic Echocardiogram (09.08.21 @ 15:44) >  1. Normal mitral valve. No mitral regurgitation seen.  2. Calcified trileaflet aortic valve with normal opening.  Mild aortic regurgitation.  3. Aortic Root: 3.6 cm.  4. Normal left ventricular systolic function. No segmental  wall motion abnormalities.  5. Normal right ventricular size and function.      Troponin T, High Sensitivity (06.16.25 @ 06:52)    Troponin T, High Sensitivity Result: 8: *  *  Rapid upward or downward changes in high-sensitivity troponin levels  suggest acute myocardial injury. Renal impairment may cause sustained  troponin elevations.  Normal: <6 - 14 ng/L  Indeterminate: 15-51 ng/L  Elevated: > 51 ng/L  See http://labs/test/TROPTHS on the [facility] intranet for more  information ng/L      Assessment and plan  ---------------------------  Patient is a 76yo Male with past medical history of HTN, HLD, CAD s/p stent (2022), recent H. pylori infection,  presenting with epigastric and reported CP x 2 days.   Pt reported epigastric pain that began yesterday that radiated to his middle and L chest as well as L shoulder/upper arm.   Pt in general poor historian and with difficulty describing symptoms but reported "not feeling right." ? cough with white sputum. Pt reported he had an episode of similar symptoms 1 week ago that had resolved.   Of note, reported to ED that he went to OSH last week for b/l LE edema over the past 2-3 weeks and was advised to f/u with his doctor. No history of heart failure, does not take any diuretics.   Daughter was at bedside and reported to ED that pt follows with all of his doctors regularly, does not know for sure if patient had recent cardiac testing.    Notes currently only has shoulder/arm pain, no longer with chest pain.  pt with hx of htn, ashd, s/p SERA of the LAD with chest pain/ abdominal pain  gi eval  ct scan chest abdomen and pelvis  protonix  continue all cardiac meds  tte to assess the regional wall motion abnormality  awaiting echo, will need ischemia olivas will try to call his PMD  DVT prophylaxis  plan for cardiac cath today if pt agreed    	        
Date of Service: 06-16-25 @ 08:03           CARDIOLOGY     PROGRESS  NOTE   ________________________________________________  CHIEF COMPLAINT:Patient is a 75y old  Male who presents with a chief complaint of left arm pain, chest  discomfort (15 Michael 2025 18:11)  doing better  	  REVIEW OF SYSTEMS:  CONSTITUTIONAL: No fever, weight loss, or fatigue  EYES: No eye pain, visual disturbances, or discharge  ENT:  No difficulty hearing, tinnitus, vertigo; No sinus or throat pain  NECK: No pain or stiffness  RESPIRATORY: No cough, wheezing, chills or hemoptysis; No Shortness of Breath  CARDIOVASCULAR: No chest pain, palpitations, passing out, dizziness, or leg swelling  GASTROINTESTINAL: No abdominal or epigastric pain. No nausea, vomiting, or hematemesis; No diarrhea or constipation. No melena or hematochezia.  GENITOURINARY: No dysuria, frequency, hematuria, or incontinence  NEUROLOGICAL: No headaches, memory loss, loss of strength, numbness, or tremors  SKIN: No itching, burning, rashes, or lesions   LYMPH Nodes: No enlarged glands  ENDOCRINE: No heat or cold intolerance; No hair loss  MUSCULOSKELETAL: No joint pain or swelling; No muscle, back, or extremity pain  PSYCHIATRIC: No depression, anxiety, mood swings, or difficulty sleeping  HEME/LYMPH: No easy bruising, or bleeding gums  ALLERGY AND IMMUNOLOGIC: No hives or eczema	    [x ] All others negative	  [ ] Unable to obtain    PHYSICAL EXAM:  T(C): 36.4 (06-16-25 @ 05:39), Max: 36.8 (06-15-25 @ 12:05)  HR: 62 (06-16-25 @ 05:39) (60 - 74)  BP: 131/72 (06-16-25 @ 05:39) (131/72 - 149/71)  RR: 18 (06-16-25 @ 05:39) (17 - 18)  SpO2: 96% (06-16-25 @ 05:39) (95% - 99%)  Wt(kg): --  I&O's Summary    15 Michael 2025 07:01  -  16 Jun 2025 07:00  --------------------------------------------------------  IN: 0 mL / OUT: 300 mL / NET: -300 mL        Appearance: Normal	  HEENT:   Normal oral mucosa, PERRL, EOMI	  Lymphatic: No lymphadenopathy  Cardiovascular: Normal S1 S2, No JVD, + murmurs, No edema  Respiratory: rhonchi  Psychiatry: A & O x 3, Mood & affect appropriate  Gastrointestinal:  Soft, Non-tender, + BS	  Skin: No rashes, No ecchymoses, No cyanosis	  Extremities:  No clubbing, cyanosis or edema  Vascular: Peripheral pulses palpable 2+ bilaterally    MEDICATIONS  (STANDING):  amLODIPine   Tablet 10 milliGRAM(s) Oral daily  aspirin enteric coated 81 milliGRAM(s) Oral daily  atorvastatin 40 milliGRAM(s) Oral at bedtime  latanoprost 0.005% Ophthalmic Solution 1 Drop(s) Both EYES at bedtime  pantoprazole    Tablet 40 milliGRAM(s) Oral before breakfast      TELEMETRY: 	    ECG:  	  RADIOLOGY:  OTHER: 	  	  LABS:	 	    CARDIAC MARKERS:                                14.5   4.92  )-----------( 214      ( 16 Jun 2025 06:52 )             41.7     06-16    140  |  105  |  8   ----------------------------<  80  4.1   |  23  |  0.86    Ca    9.4      16 Jun 2025 06:52    TPro  6.9  /  Alb  3.9  /  TBili  0.6  /  DBili  x   /  AST  25  /  ALT  20  /  AlkPhos  78  06-16    proBNP:   Lipid Profile:   HgA1c:   TSH:   PT/INR - ( 15 Michael 2025 08:03 )   PT: 11.0 sec;   INR: 0.96 ratio         PTT - ( 15 Michael 2025 08:03 )  PTT:31.8 sec        Assessment and plan  ---------------------------  Patient is a 76yo Male with past medical history of HTN, HLD, CAD s/p stent (2022), recent H. pylori infection,  presenting with epigastric and reported CP x 2 days.   Pt reported epigastric pain that began yesterday that radiated to his middle and L chest as well as L shoulder/upper arm.   Pt in general poor historian and with difficulty describing symptoms but reported "not feeling right." ? cough with white sputum. Pt reported he had an episode of similar symptoms 1 week ago that had resolved.   Of note, reported to ED that he went to OSH last week for b/l LE edema over the past 2-3 weeks and was advised to f/u with his doctor. No history of heart failure, does not take any diuretics.   Daughter was at bedside and reported to ED that pt follows with all of his doctors regularly, does not know for sure if patient had recent cardiac testing.    Notes currently only has shoulder/arm pain, no longer with chest pain.  pt with hx of htn, ashd, s/p SERA of the LAD with chest pain/ abdominal pain  gi eval  ct scan chest abdomen and pelvis  protonix  continue all cardiac meds  tte to assess the regional wall motion abnormality  awaiting echo, will need ischemia olivas will try to call his PMD  DVT prophylaxis    	        
Date of Service: 06-18-25 @ 08:03           CARDIOLOGY     PROGRESS  NOTE   ________________________________________________    CHIEF COMPLAINT:Patient is a 75y old  Male who presents with a chief complaint of left arm pain, chest  discomfort (17 Jun 2025 13:34)  no complain  	  REVIEW OF SYSTEMS:  CONSTITUTIONAL: No fever, weight loss, or fatigue  EYES: No eye pain, visual disturbances, or discharge  ENT:  No difficulty hearing, tinnitus, vertigo; No sinus or throat pain  NECK: No pain or stiffness  RESPIRATORY: No cough, wheezing, chills or hemoptysis; No Shortness of Breath  CARDIOVASCULAR: No chest pain, palpitations, passing out, dizziness, or leg swelling  GASTROINTESTINAL: No abdominal or epigastric pain. No nausea, vomiting, or hematemesis; No diarrhea or constipation. No melena or hematochezia.  GENITOURINARY: No dysuria, frequency, hematuria, or incontinence  NEUROLOGICAL: No headaches, memory loss, loss of strength, numbness, or tremors  SKIN: No itching, burning, rashes, or lesions   LYMPH Nodes: No enlarged glands  ENDOCRINE: No heat or cold intolerance; No hair loss  MUSCULOSKELETAL: No joint pain or swelling; No muscle, back, or extremity pain  PSYCHIATRIC: No depression, anxiety, mood swings, or difficulty sleeping  HEME/LYMPH: No easy bruising, or bleeding gums  ALLERGY AND IMMUNOLOGIC: No hives or eczema	    [ x] All others negative	  [ ] Unable to obtain    PHYSICAL EXAM:  T(C): 36.5 (06-18-25 @ 04:57), Max: 36.8 (06-17-25 @ 15:14)  HR: 63 (06-18-25 @ 04:57) (55 - 76)  BP: 136/72 (06-18-25 @ 04:57) (116/65 - 149/79)  RR: 18 (06-18-25 @ 04:57) (16 - 18)  SpO2: 95% (06-18-25 @ 04:57) (95% - 98%)  Wt(kg): --  I&O's Summary      Appearance: Normal	  HEENT:   Normal oral mucosa, PERRL, EOMI	  Lymphatic: No lymphadenopathy  Cardiovascular: Normal S1 S2, No JVD, + murmurs, No edema  Respiratory: Lungs clear to auscultation	  Psychiatry: A & O x 3, Mood & affect appropriate  Gastrointestinal:  Soft, Non-tender, + BS	  Skin: No rashes, No ecchymoses, No cyanosis	  Neurologic: Non-focal  Extremities: Normal range of motion, No clubbing, cyanosis or edema  Vascular: Peripheral pulses palpable 2+ bilaterally    MEDICATIONS  (STANDING):  amLODIPine   Tablet 10 milliGRAM(s) Oral daily  aspirin enteric coated 81 milliGRAM(s) Oral daily  atorvastatin 40 milliGRAM(s) Oral at bedtime  heparin   Injectable 5000 Unit(s) SubCutaneous every 12 hours  latanoprost 0.005% Ophthalmic Solution 1 Drop(s) Both EYES at bedtime  pantoprazole    Tablet 40 milliGRAM(s) Oral before breakfast  sodium chloride 0.9%. 1000 milliLiter(s) (75 mL/Hr) IV Continuous <Continuous>      TELEMETRY: 	    ECG:  	  RADIOLOGY:  OTHER: 	  	  LABS:	 	    CARDIAC MARKERS:            06-18    142  |  105  |  13  ----------------------------<  82  3.8   |  22  |  0.92    Ca    9.2      18 Jun 2025 06:44      proBNP:   Lipid Profile: Cholesterol 161  LDL --  HDL 54  TG 56    HgA1c:   TSH:     < from: TTE W or WO Ultrasound Enhancing Agent (06.17.25 @ 11:48) >   1. Left ventricular cavity is small. Left ventricular wall thickness is normal. Left ventricular systolic function is normal with an ejection fraction of 67 % by Levy's method of disks. There are no regional wall motionabnormalities seen.   2. Normal right ventricular cavity size, with normal wall thickness, and normal right ventricular systolic function.   3. No pericardial effusion seen.   4. No prior echocardiogram is available for comparison.   5. Left ventricular global longitudinal strain is -22.1 % which is normal (< -18%). Images were acquired on a [employer] ultrasound system and processed on the ultrasound machine with a heart rate of 64 bpm and a blood pressure of 131/71 mmHg.   6. Mild to moderate aortic regurgitation.   7. The inferior vena cava is normal in size measuring 1.97 cm in diameter, (normal <2.1cm) with abnormal inspiratory collapse (abnormal <50%) consistent with mildly elevated right atrial pressure (~8, range 5-10mmHg).   8. There is normal LV mass and concentric remodeling.      Assessment and plan  ---------------------------  Patient is a 74yo Male with past medical history of HTN, HLD, CAD s/p stent (2022), recent H. pylori infection,  presenting with epigastric and reported CP x 2 days.   Pt reported epigastric pain that began yesterday that radiated to his middle and L chest as well as L shoulder/upper arm.   Pt in general poor historian and with difficulty describing symptoms but reported "not feeling right." ? cough with white sputum. Pt reported he had an episode of similar symptoms 1 week ago that had resolved.   Of note, reported to ED that he went to OSH last week for b/l LE edema over the past 2-3 weeks and was advised to f/u with his doctor. No history of heart failure, does not take any diuretics.   Daughter was at bedside and reported to ED that pt follows with all of his doctors regularly, does not know for sure if patient had recent cardiac testing.    Notes currently only has shoulder/arm pain, no longer with chest pain.  pt with hx of htn, ashd, s/p SERA of the LAD with chest pain/ abdominal pain  gi eval  ct scan chest abdomen and pelvis  protonix  continue all cardiac meds  tte to assess the regional wall motion abnormality  awaiting echo, will need ischemia olivas will try to call his PMD  DVT prophylaxis  cardiac cath noted non obstructive patent Stent  echo noted  no objection to DC cardiac wise

## 2025-06-18 NOTE — DISCHARGE NOTE PROVIDER - NSDCCAREPROVSEEN_GEN_ALL_CORE_FT
Jose A Joseph Jose A Cabezas  [facility]      [ Greater than 35 min spent for discharge services.   ED Joseph ]       ( Note written / Date of service is the same as last day of patient stay  in the hospital ( for billing purposes)))

## 2025-06-18 NOTE — DISCHARGE NOTE PROVIDER - NSDCFUADDAPPT_GEN_ALL_CORE_FT
Follow up with pmd within 3 days of discharge APPTS ARE READY TO BE MADE: [X] YES    Best Family or Patient Contact (if needed):    Additional Information about above appointments (if needed):    1: Please follow up with PCP DR Chandra  in 1 week   2: Please follow up with Cardiology in 2 weeks    3:     Other comments or requests:    APPTS ARE READY TO BE MADE: [X] YES    Best Family or Patient Contact (if needed):    Additional Information about above appointments (if needed):    1: Please follow up with PCP DR Chandra  in 1 week   2: Please follow up with Cardiology in 2 weeks    3:     Other comments or requests:   Patient informed us they already have secured a follow up appointment which was not scheduled by our team.

## 2025-06-18 NOTE — DISCHARGE NOTE NURSING/CASE MANAGEMENT/SOCIAL WORK - FINANCIAL ASSISTANCE
[facility] provides services at a reduced cost to those who are determined to be eligible through [facility]’s financial assistance program. Information regarding [facility]’s financial assistance program can be found by going to [email or web address] or by calling [phone].

## 2025-06-18 NOTE — PROGRESS NOTE ADULT - REASON FOR ADMISSION
left arm pain, chest  discomfort

## 2025-06-18 NOTE — DISCHARGE NOTE NURSING/CASE MANAGEMENT/SOCIAL WORK - NSPROEXTENSIONSOFSELF_GEN_A_NUR
walks with cane at home, does not have with him walks with cane at home, does not have with him/none